# Patient Record
Sex: FEMALE | Race: OTHER | Employment: UNEMPLOYED | URBAN - METROPOLITAN AREA
[De-identification: names, ages, dates, MRNs, and addresses within clinical notes are randomized per-mention and may not be internally consistent; named-entity substitution may affect disease eponyms.]

---
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
## 2018-03-12 ENCOUNTER — APPOINTMENT (OUTPATIENT)
Dept: GENERAL RADIOLOGY | Facility: HOSPITAL | Age: 18
End: 2018-03-12
Attending: EMERGENCY MEDICINE
Payer: MEDICAID

## 2018-03-12 ENCOUNTER — HOSPITAL ENCOUNTER (EMERGENCY)
Facility: HOSPITAL | Age: 18
Discharge: HOME | End: 2018-03-12
Attending: EMERGENCY MEDICINE
Payer: MEDICAID

## 2018-03-12 VITALS
HEART RATE: 116 BPM | RESPIRATION RATE: 20 BRPM | OXYGEN SATURATION: 100 % | BODY MASS INDEX: 28.22 KG/M2 | SYSTOLIC BLOOD PRESSURE: 115 MMHG | DIASTOLIC BLOOD PRESSURE: 70 MMHG | TEMPERATURE: 100 F | WEIGHT: 140 LBS | HEIGHT: 59 IN

## 2018-03-12 DIAGNOSIS — B34.9 VIRAL SYNDROME: Primary | ICD-10-CM

## 2018-03-12 PROCEDURE — 71046 X-RAY EXAM CHEST 2 VIEWS: CPT | Performed by: EMERGENCY MEDICINE

## 2018-03-12 PROCEDURE — 99283 EMERGENCY DEPT VISIT LOW MDM: CPT

## 2018-03-14 NOTE — ED PROVIDER NOTES
Patient Seen in: [facility] Emergency Department    History   Patient presents with:  Cough/URI      HPI    Patient presents to the ED complaining of a productive cough for the past several several days symptoms moderate in severity and constant a pulses. Pulmonary/Chest: Effort normal. No stridor. No respiratory distress. She has no wheezes. Abdominal: Soft. She exhibits no distension. There is no tenderness. Musculoskeletal: She exhibits no edema or deformity. Neurological: She is alert. on file. to contribute to the complexity of this ED evaluation. ED Course: Patient presents with respiratory infectious symptoms. Chest x-ray obtained to rule out pneumonia, no abnormalities.   Likely viral etiology, patient given supportive care instru

## 2018-06-18 ENCOUNTER — HOSPITAL ENCOUNTER (EMERGENCY)
Facility: HOSPITAL | Age: 18
Discharge: HOME | End: 2018-06-18
Attending: EMERGENCY MEDICINE
Payer: MEDICAID

## 2018-06-18 VITALS
TEMPERATURE: 98 F | OXYGEN SATURATION: 99 % | WEIGHT: 140 LBS | RESPIRATION RATE: 16 BRPM | HEIGHT: 59 IN | BODY MASS INDEX: 28.22 KG/M2 | DIASTOLIC BLOOD PRESSURE: 59 MMHG | SYSTOLIC BLOOD PRESSURE: 114 MMHG | HEART RATE: 68 BPM

## 2018-06-18 DIAGNOSIS — J02.9 VIRAL PHARYNGITIS: Primary | ICD-10-CM

## 2018-06-18 PROCEDURE — 99283 EMERGENCY DEPT VISIT LOW MDM: CPT

## 2018-06-18 NOTE — ED INITIAL ASSESSMENT (HPI)
Pt reports sore throat and pain when swallowing that began today. Pt denies other sick contacts at home.  Pt reports taking tylenol this am

## 2018-06-18 NOTE — ED PROVIDER NOTES
Patient Seen in: [facility] Emergency Department    History   Patient presents with:  Sore Throat    Stated Complaint: sore throat    HPI    The patient is an 23year-old female who presents with sore throat since yesterday.   Mild nasal congestion pain. Neck supple. No neck rigidity. No Brudzinski's sign and no Kernig's sign noted. Cardiovascular: Normal rate, regular rhythm, normal heart sounds and intact distal pulses. No murmur heard.   Pulmonary/Chest: Effort normal and breath sounds normal.

## 2018-06-19 ENCOUNTER — HOSPITAL ENCOUNTER (EMERGENCY)
Facility: HOSPITAL | Age: 18
Discharge: HOME | End: 2018-06-20
Attending: EMERGENCY MEDICINE
Payer: MEDICAID

## 2018-06-19 DIAGNOSIS — J02.9 PHARYNGITIS, UNSPECIFIED ETIOLOGY: Primary | ICD-10-CM

## 2018-06-19 PROCEDURE — 99284 EMERGENCY DEPT VISIT MOD MDM: CPT

## 2018-06-19 PROCEDURE — 96361 HYDRATE IV INFUSION ADD-ON: CPT

## 2018-06-19 PROCEDURE — 85025 COMPLETE CBC W/AUTO DIFF WBC: CPT | Performed by: EMERGENCY MEDICINE

## 2018-06-19 PROCEDURE — 87430 STREP A AG IA: CPT

## 2018-06-19 PROCEDURE — 80048 BASIC METABOLIC PNL TOTAL CA: CPT | Performed by: EMERGENCY MEDICINE

## 2018-06-19 PROCEDURE — 86308 HETEROPHILE ANTIBODY SCREEN: CPT | Performed by: EMERGENCY MEDICINE

## 2018-06-19 PROCEDURE — 96374 THER/PROPH/DIAG INJ IV PUSH: CPT

## 2018-06-20 VITALS
HEIGHT: 59 IN | WEIGHT: 140 LBS | RESPIRATION RATE: 16 BRPM | TEMPERATURE: 99 F | HEART RATE: 82 BPM | SYSTOLIC BLOOD PRESSURE: 124 MMHG | BODY MASS INDEX: 28.22 KG/M2 | OXYGEN SATURATION: 99 % | DIASTOLIC BLOOD PRESSURE: 76 MMHG

## 2018-06-20 PROCEDURE — 87086 URINE CULTURE/COLONY COUNT: CPT | Performed by: EMERGENCY MEDICINE

## 2018-06-20 PROCEDURE — 81025 URINE PREGNANCY TEST: CPT

## 2018-06-20 PROCEDURE — 81001 URINALYSIS AUTO W/SCOPE: CPT | Performed by: EMERGENCY MEDICINE

## 2018-06-20 RX ORDER — DEXAMETHASONE SODIUM PHOSPHATE 4 MG/ML
10 VIAL (ML) INJECTION ONCE
Status: COMPLETED | OUTPATIENT
Start: 2018-06-20 | End: 2018-06-20

## 2018-06-20 RX ORDER — KETOROLAC TROMETHAMINE 30 MG/ML
30 INJECTION, SOLUTION INTRAMUSCULAR; INTRAVENOUS ONCE
Status: COMPLETED | OUTPATIENT
Start: 2018-06-20 | End: 2018-06-20

## 2018-06-20 RX ORDER — CEPHALEXIN 500 MG/1
500 CAPSULE ORAL 4 TIMES DAILY
Qty: 40 CAPSULE | Refills: 0 | Status: SHIPPED | OUTPATIENT
Start: 2018-06-20

## 2018-06-20 NOTE — ED PROVIDER NOTES
Patient Seen in: [facility] Emergency Department    History   Patient presents with:  Sore Throat    Stated Complaint: sore throat    HPI    She presents emergency room complaining of sore throat.   She describes dull throbbing aching pain worse wi abscesses. Eyes: Conjunctivae and EOM are normal.   Neck: Normal range of motion. Neck supple. Cardiovascular: Normal rate and regular rhythm. No murmur heard. Pulmonary/Chest: Effort normal and breath sounds normal. No respiratory distress.    Delano Del Rosario appointment as soon as possible for a visit          Medications Prescribed:  Current Discharge Medication List    START taking these medications    cephALEXin 500 MG Oral Cap  Take 1 capsule (500 mg total) by mouth 4 (four) times daily.   Qty: 40 capsule R

## 2018-06-20 NOTE — ED INITIAL ASSESSMENT (HPI)
Sore throat since Saturday.  Evaluated in ED yesterday AM and sent home with tylenol and motrin which the patient states is not helping

## 2018-09-05 ENCOUNTER — HOSPITAL ENCOUNTER (EMERGENCY)
Facility: HOSPITAL | Age: 18
Discharge: HOME | End: 2018-09-05
Attending: EMERGENCY MEDICINE
Payer: MEDICAID

## 2018-09-05 VITALS
SYSTOLIC BLOOD PRESSURE: 110 MMHG | HEART RATE: 82 BPM | WEIGHT: 146.19 LBS | RESPIRATION RATE: 18 BRPM | TEMPERATURE: 98 F | OXYGEN SATURATION: 99 % | HEIGHT: 59 IN | DIASTOLIC BLOOD PRESSURE: 60 MMHG | BODY MASS INDEX: 29.47 KG/M2

## 2018-09-05 DIAGNOSIS — J06.9 VIRAL UPPER RESPIRATORY TRACT INFECTION: Primary | ICD-10-CM

## 2018-09-05 LAB — S PYO AG THROAT QL: NEGATIVE

## 2018-09-05 PROCEDURE — 99283 EMERGENCY DEPT VISIT LOW MDM: CPT

## 2018-09-05 PROCEDURE — 87430 STREP A AG IA: CPT

## 2018-09-06 NOTE — ED PROVIDER NOTES
Patient Seen in: [facility] AND [facility] Emergency Department    History   Patient presents with:  Cough/URI    Stated Complaint: sore throat and cough    HPI    Patient is a 15-year-old female who presents with sore throat since yesterday.   Positive cough and [facility])       ED Course as of Sep 05 2232  ------------------------------------------------------------      MDM     strep negative. Tolerating PO, appears well. Advised on OTC treatments.    Pulse ox 99% RA normal          Disposition and Plan     Clinical I

## 2018-10-25 ENCOUNTER — HOSPITAL ENCOUNTER (EMERGENCY)
Facility: HOSPITAL | Age: 18
Discharge: HOME | End: 2018-10-25
Attending: EMERGENCY MEDICINE
Payer: MEDICAID

## 2018-10-25 ENCOUNTER — APPOINTMENT (OUTPATIENT)
Dept: GENERAL RADIOLOGY | Facility: HOSPITAL | Age: 18
End: 2018-10-25
Payer: MEDICAID

## 2018-10-25 VITALS
HEART RATE: 69 BPM | DIASTOLIC BLOOD PRESSURE: 79 MMHG | HEIGHT: 59 IN | OXYGEN SATURATION: 100 % | SYSTOLIC BLOOD PRESSURE: 99 MMHG | WEIGHT: 140 LBS | RESPIRATION RATE: 17 BRPM | TEMPERATURE: 99 F | BODY MASS INDEX: 28.22 KG/M2

## 2018-10-25 DIAGNOSIS — J06.9 VIRAL UPPER RESPIRATORY TRACT INFECTION: Primary | ICD-10-CM

## 2018-10-25 PROCEDURE — 87430 STREP A AG IA: CPT

## 2018-10-25 PROCEDURE — 99283 EMERGENCY DEPT VISIT LOW MDM: CPT

## 2018-10-25 PROCEDURE — 71046 X-RAY EXAM CHEST 2 VIEWS: CPT

## 2018-10-25 NOTE — ED PROVIDER NOTES
Patient Seen in: [facility] AND [facility] Emergency Department    History   Patient presents with:  Sore Throat  Cough/URI    Stated Complaint: sore throat, cough w mucus, headache    HPI    Patient presents to the emergency department today with complaint of 7 other systems reviewed and negative except as noted above. PSFH elements reviewed from today and agreed except as otherwise stated in HPI.     Physical Exam     ED Triage Vitals [10/25/18 1323]   BP 99/79   Pulse 69   Resp 17   Temp 99.1 °F (37.3 °C)   T diagnosis)    Disposition:  Discharge    Follow-up:  [facility] Le [facility]  [phone]    In 3 days  For re-check      Medications Prescribed:  Current Discharge Medication List

## 2018-11-01 ENCOUNTER — APPOINTMENT (OUTPATIENT)
Dept: OTHER | Facility: HOSPITAL | Age: 18
End: 2018-11-01
Attending: FAMILY MEDICINE

## 2019-05-20 ENCOUNTER — HOSPITAL ENCOUNTER (EMERGENCY)
Facility: HOSPITAL | Age: 19
Discharge: HOME | End: 2019-05-20
Attending: EMERGENCY MEDICINE
Payer: MEDICAID

## 2019-05-20 ENCOUNTER — APPOINTMENT (OUTPATIENT)
Dept: GENERAL RADIOLOGY | Facility: HOSPITAL | Age: 19
End: 2019-05-20
Attending: EMERGENCY MEDICINE
Payer: MEDICAID

## 2019-05-20 VITALS
BODY MASS INDEX: 29 KG/M2 | OXYGEN SATURATION: 98 % | SYSTOLIC BLOOD PRESSURE: 103 MMHG | RESPIRATION RATE: 18 BRPM | TEMPERATURE: 98 F | WEIGHT: 145 LBS | HEART RATE: 70 BPM | DIASTOLIC BLOOD PRESSURE: 67 MMHG

## 2019-05-20 DIAGNOSIS — M25.561 ARTHRALGIA OF RIGHT LOWER LEG: Primary | ICD-10-CM

## 2019-05-20 PROCEDURE — 99283 EMERGENCY DEPT VISIT LOW MDM: CPT

## 2019-05-20 PROCEDURE — 73590 X-RAY EXAM OF LOWER LEG: CPT | Performed by: EMERGENCY MEDICINE
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
## 2019-05-21 NOTE — ED INITIAL ASSESSMENT (HPI)
Pt c/o right leg pain, pt states she has metal felton placed in leg and states that leg hurts when the weather changes.
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
## 2019-05-29 ENCOUNTER — HOSPITAL ENCOUNTER (EMERGENCY)
Facility: HOSPITAL | Age: 19
Discharge: HOME | End: 2019-05-29
Payer: MEDICAID

## 2019-05-29 VITALS
OXYGEN SATURATION: 98 % | TEMPERATURE: 98 F | DIASTOLIC BLOOD PRESSURE: 69 MMHG | HEART RATE: 62 BPM | RESPIRATION RATE: 20 BRPM | SYSTOLIC BLOOD PRESSURE: 105 MMHG

## 2019-05-29 DIAGNOSIS — S39.012A STRAIN OF LUMBAR REGION, INITIAL ENCOUNTER: Primary | ICD-10-CM

## 2019-05-29 PROCEDURE — 99283 EMERGENCY DEPT VISIT LOW MDM: CPT

## 2019-05-29 PROCEDURE — 87086 URINE CULTURE/COLONY COUNT: CPT | Performed by: NURSE PRACTITIONER

## 2019-05-29 PROCEDURE — 81025 URINE PREGNANCY TEST: CPT

## 2019-05-29 PROCEDURE — 81001 URINALYSIS AUTO W/SCOPE: CPT | Performed by: NURSE PRACTITIONER

## 2019-05-29 RX ORDER — CYCLOBENZAPRINE HCL 10 MG
10 TABLET ORAL 3 TIMES DAILY PRN
Qty: 14 TABLET | Refills: 0 | Status: SHIPPED | OUTPATIENT
Start: 2019-05-29 | End: 2019-06-05

## 2019-05-29 RX ORDER — TRAMADOL HYDROCHLORIDE 50 MG/1
TABLET ORAL EVERY 6 HOURS PRN
Qty: 10 TABLET | Refills: 0 | Status: SHIPPED | OUTPATIENT
Start: 2019-05-29 | End: 2019-06-05

## 2019-05-29 RX ORDER — IBUPROFEN 600 MG/1
600 TABLET ORAL ONCE
Status: COMPLETED | OUTPATIENT
Start: 2019-05-29 | End: 2019-05-29

## 2019-05-29 NOTE — ED INITIAL ASSESSMENT (HPI)
Low back pain began yesterday. 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

## 2019-05-29 NOTE — ED NOTES
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 States \"it took my breath away\". Denies injury or trauma. Denies urinary/bowel symptoms, numbness or tingling. States took 800mg of ibuprofen last night with no relief.

## 2019-05-29 NOTE — ED PROVIDER NOTES
Patient Seen in: [facility] Emergency Department    History   Patient presents with:  Back Pain (musculoskeletal)    Stated Complaint: back pains    HPI    47-year-old female presents to the emergency department complaining of right lower back bell bilateral  Neuro: Patient is alert and oriented x3, able to ambulate with steady gait, 5 out of 5 strength bilateral lower extremities hips knees and ankle flexion and extension, dorsiflexion and plantarflexion of the foot preserved bilateral 5 out of 5 st Discharge Medication List    START taking these medications    Cyclobenzaprine HCl 10 MG Oral Tab  Take 1 tablet (10 mg total) by mouth 3 (three) times daily as needed.   Qty: 14 tablet Refills: 0    traMADol HCl 50 MG Oral Tab  Take 1-2 tablets ( mg

## 2019-10-08 ENCOUNTER — HOSPITAL ENCOUNTER (EMERGENCY)
Facility: HOSPITAL | Age: 19
Discharge: HOME | End: 2019-10-08
Attending: PHYSICIAN ASSISTANT
Payer: MEDICAID

## 2019-10-08 VITALS
TEMPERATURE: 98 F | HEART RATE: 52 BPM | HEIGHT: 59 IN | SYSTOLIC BLOOD PRESSURE: 112 MMHG | OXYGEN SATURATION: 99 % | DIASTOLIC BLOOD PRESSURE: 64 MMHG | WEIGHT: 145 LBS | RESPIRATION RATE: 17 BRPM | BODY MASS INDEX: 29.23 KG/M2

## 2019-10-08 DIAGNOSIS — R11.2 NAUSEA AND VOMITING IN ADULT: ICD-10-CM

## 2019-10-08 DIAGNOSIS — N39.0 URINARY TRACT INFECTION WITHOUT HEMATURIA, SITE UNSPECIFIED: Primary | ICD-10-CM

## 2019-10-08 PROCEDURE — 87086 URINE CULTURE/COLONY COUNT: CPT | Performed by: PHYSICIAN ASSISTANT

## 2019-10-08 PROCEDURE — 85025 COMPLETE CBC W/AUTO DIFF WBC: CPT | Performed by: PHYSICIAN ASSISTANT

## 2019-10-08 PROCEDURE — 81001 URINALYSIS AUTO W/SCOPE: CPT | Performed by: PHYSICIAN ASSISTANT

## 2019-10-08 PROCEDURE — 80053 COMPREHEN METABOLIC PANEL: CPT | Performed by: PHYSICIAN ASSISTANT

## 2019-10-08 PROCEDURE — 96374 THER/PROPH/DIAG INJ IV PUSH: CPT

## 2019-10-08 PROCEDURE — 96361 HYDRATE IV INFUSION ADD-ON: CPT

## 2019-10-08 PROCEDURE — 99284 EMERGENCY DEPT VISIT MOD MDM: CPT

## 2019-10-08 PROCEDURE — 81025 URINE PREGNANCY TEST: CPT

## 2019-10-08 RX ORDER — ONDANSETRON 4 MG/1
4 TABLET, ORALLY DISINTEGRATING ORAL EVERY 6 HOURS PRN
Qty: 10 TABLET | Refills: 0 | Status: SHIPPED | OUTPATIENT
Start: 2019-10-08 | End: 2019-10-11

## 2019-10-08 RX ORDER — ONDANSETRON 2 MG/ML
4 INJECTION INTRAMUSCULAR; INTRAVENOUS ONCE
Status: COMPLETED | OUTPATIENT
Start: 2019-10-08 | End: 2019-10-08

## 2019-10-08 RX ORDER — CEPHALEXIN 500 MG/1
500 CAPSULE ORAL 3 TIMES DAILY
Qty: 21 CAPSULE | Refills: 0 | Status: SHIPPED | OUTPATIENT
Start: 2019-10-08 | End: 2019-10-15

## 2019-10-08 NOTE — ED PROVIDER NOTES
Patient Seen in: [facility] AND [facility] Emergency Department    History   Patient presents with:  Vomiting    Stated Complaint: vomiting    HPI    Sergei Javed is a 19year old female who presents with chief complaint of nausea and vomiting.   Onset 2 days ago noted above. PSFH elements reviewed from today and agreed except as otherwise stated in HPI.     Physical Exam     ED Triage Vitals [10/08/19 1224]   /75   Pulse 72   Resp 18   Temp 98.3 °F (36.8 °C)   Temp src Oral   SpO2 99 %   O2 Device None (Ro components:       Result Value    Leukocyte Esterase Urine Moderate (*)     WBC Urine 7 (*)     Bacteria Urine Few (*)     All other components within normal limits   COMP METABOLIC PANEL (14) - Abnormal; Notable for the following components:    AST 10 (*) pressure. Medications Prescribed:  Current Discharge Medication List    START taking these medications    !! cephALEXin (KEFLEX) 500 MG Oral Cap  Take 1 capsule (500 mg total) by mouth 3 (three) times daily for 7 days.   Qty: 21 capsule Refills: 0    !!

## 2020-10-08 ENCOUNTER — HOSPITAL ENCOUNTER (EMERGENCY)
Facility: HOSPITAL | Age: 20
Discharge: HOME | End: 2020-10-08
Attending: EMERGENCY MEDICINE
Payer: MEDICAID

## 2020-10-08 VITALS
RESPIRATION RATE: 18 BRPM | HEART RATE: 75 BPM | SYSTOLIC BLOOD PRESSURE: 106 MMHG | TEMPERATURE: 98 F | WEIGHT: 140 LBS | DIASTOLIC BLOOD PRESSURE: 70 MMHG | BODY MASS INDEX: 28 KG/M2 | OXYGEN SATURATION: 100 %

## 2020-10-08 DIAGNOSIS — M43.6 TORTICOLLIS: Primary | ICD-10-CM

## 2020-10-08 PROCEDURE — 99283 EMERGENCY DEPT VISIT LOW MDM: CPT

## 2020-10-08 PROCEDURE — 96372 THER/PROPH/DIAG INJ SC/IM: CPT

## 2020-10-08 RX ORDER — KETOROLAC TROMETHAMINE 30 MG/ML
30 INJECTION, SOLUTION INTRAMUSCULAR; INTRAVENOUS ONCE
Status: COMPLETED | OUTPATIENT
Start: 2020-10-08 | End: 2020-10-08

## 2020-10-08 RX ORDER — DIAZEPAM 5 MG/1
5 TABLET ORAL ONCE
Status: COMPLETED | OUTPATIENT
Start: 2020-10-08 | End: 2020-10-08

## 2020-10-08 RX ORDER — IBUPROFEN 600 MG/1
600 TABLET ORAL EVERY 8 HOURS PRN
Qty: 15 TABLET | Refills: 0 | Status: SHIPPED | OUTPATIENT
Start: 2020-10-08 | End: 2020-10-13

## 2020-10-08 RX ORDER — HYDROCODONE BITARTRATE AND ACETAMINOPHEN 5; 325 MG/1; MG/1
1 TABLET ORAL EVERY 6 HOURS PRN
Qty: 10 TABLET | Refills: 0 | Status: SHIPPED | OUTPATIENT
Start: 2020-10-08

## 2020-10-08 RX ORDER — CYCLOBENZAPRINE HCL 10 MG
10 TABLET ORAL 3 TIMES DAILY PRN
Qty: 10 TABLET | Refills: 0 | Status: SHIPPED | OUTPATIENT
Start: 2020-10-08 | End: 2020-10-11

## 2020-10-08 RX ORDER — HYDROCODONE BITARTRATE AND ACETAMINOPHEN 5; 325 MG/1; MG/1
1 TABLET ORAL ONCE
Status: COMPLETED | OUTPATIENT
Start: 2020-10-08 | End: 2020-10-08

## 2020-10-08 RX ORDER — LIDOCAINE 50 MG/G
1 PATCH TOPICAL EVERY 24 HOURS
Qty: 6 PATCH | Refills: 0 | Status: SHIPPED | OUTPATIENT
Start: 2020-10-08 | End: 2020-10-14

## 2020-10-08 NOTE — ED NOTES
Patient provided discharge instructions. Instructions reviewed with patient. Patient verbalized understanding. All questions/concerns addressed. Paper prescription given. Pharmacy verified. Patient states pain has improved slightly.

## 2020-10-08 NOTE — ED INITIAL ASSESSMENT (HPI)
Heike c/o neck pain and stiffness since 8am.  States she stood up, turned, and heard \"pop\". States she has neck pain and difficulty turning head side to side.

## 2020-10-08 NOTE — ED PROVIDER NOTES
Patient Seen in: [facility] AND [facility] Emergency Department      History   Patient presents with:  Neck Pain    Stated Complaint: stiff neck    HPI    17-year-old female with complaints of atraumatic left-sided neck pain and difficulty moving the neck starti Effort normal. No respiratory distress. Musculoskeletal: Normal range of motion. No edema or tenderness. Neurological: Alert and oriented to person, place, and time. No focal deficits appreciated  Skin: Skin is warm and dry.    Nursing note and vitals

## 2022-06-25 ENCOUNTER — APPOINTMENT (OUTPATIENT)
Dept: ULTRASOUND IMAGING | Facility: HOSPITAL | Age: 22
End: 2022-06-25
Attending: EMERGENCY MEDICINE
Payer: MEDICAID

## 2022-06-25 ENCOUNTER — HOSPITAL ENCOUNTER (EMERGENCY)
Facility: HOSPITAL | Age: 22
Discharge: HOME | End: 2022-06-25
Attending: EMERGENCY MEDICINE
Payer: MEDICAID

## 2022-06-25 VITALS
TEMPERATURE: 99 F | OXYGEN SATURATION: 98 % | WEIGHT: 130 LBS | HEIGHT: 59 IN | HEART RATE: 62 BPM | SYSTOLIC BLOOD PRESSURE: 102 MMHG | BODY MASS INDEX: 26.21 KG/M2 | DIASTOLIC BLOOD PRESSURE: 69 MMHG | RESPIRATION RATE: 16 BRPM

## 2022-06-25 DIAGNOSIS — Z34.90 EARLY STAGE OF PREGNANCY: Primary | ICD-10-CM

## 2022-06-25 DIAGNOSIS — N39.0 URINARY TRACT INFECTION WITHOUT HEMATURIA, SITE UNSPECIFIED: ICD-10-CM

## 2022-06-25 LAB
B-HCG SERPL-ACNC: 1533 MIU/ML
BASOPHILS # BLD AUTO: 0.02 X10(3) UL (ref 0–0.2)
BASOPHILS NFR BLD AUTO: 0.2 %
BILIRUB UR QL: NEGATIVE
COLOR UR: YELLOW
DEPRECATED RDW RBC AUTO: 47.7 FL (ref 35.1–46.3)
EOSINOPHIL # BLD AUTO: 0.05 X10(3) UL (ref 0–0.7)
EOSINOPHIL NFR BLD AUTO: 0.6 %
ERYTHROCYTE [DISTWIDTH] IN BLOOD BY AUTOMATED COUNT: 16.4 % (ref 11–15)
GLUCOSE UR-MCNC: NEGATIVE MG/DL
HCT VFR BLD AUTO: 36.9 %
HGB BLD-MCNC: 11.3 G/DL
HGB UR QL STRIP.AUTO: NEGATIVE
IMM GRANULOCYTES # BLD AUTO: 0.01 X10(3) UL (ref 0–1)
IMM GRANULOCYTES NFR BLD: 0.1 %
KETONES UR-MCNC: NEGATIVE MG/DL
LYMPHOCYTES # BLD AUTO: 3.41 X10(3) UL (ref 1–4)
LYMPHOCYTES NFR BLD AUTO: 38.1 %
MCH RBC QN AUTO: 24.7 PG (ref 26–34)
MCHC RBC AUTO-ENTMCNC: 30.6 G/DL (ref 31–37)
MCV RBC AUTO: 80.7 FL
MONOCYTES # BLD AUTO: 0.52 X10(3) UL (ref 0.1–1)
MONOCYTES NFR BLD AUTO: 5.8 %
NEUTROPHILS # BLD AUTO: 4.94 X10 (3) UL (ref 1.5–7.7)
NEUTROPHILS # BLD AUTO: 4.94 X10(3) UL (ref 1.5–7.7)
NEUTROPHILS NFR BLD AUTO: 55.2 %
NITRITE UR QL STRIP.AUTO: NEGATIVE
PH UR: 6 [PH] (ref 5–8)
PLATELET # BLD AUTO: 332 10(3)UL (ref 150–450)
PROT UR-MCNC: 30 MG/DL
RBC # BLD AUTO: 4.57 X10(6)UL
RH BLOOD TYPE: POSITIVE
SP GR UR STRIP: >1.03 (ref 1–1.03)
UROBILINOGEN UR STRIP-ACNC: 2
VIT C UR-MCNC: NEGATIVE MG/DL
WBC # BLD AUTO: 9 X10(3) UL (ref 4–11)

## 2022-06-25 PROCEDURE — 81025 URINE PREGNANCY TEST: CPT

## 2022-06-25 PROCEDURE — 36415 COLL VENOUS BLD VENIPUNCTURE: CPT

## 2022-06-25 PROCEDURE — 86900 BLOOD TYPING SEROLOGIC ABO: CPT | Performed by: EMERGENCY MEDICINE

## 2022-06-25 PROCEDURE — 85025 COMPLETE CBC W/AUTO DIFF WBC: CPT | Performed by: EMERGENCY MEDICINE

## 2022-06-25 PROCEDURE — 81001 URINALYSIS AUTO W/SCOPE: CPT | Performed by: EMERGENCY MEDICINE

## 2022-06-25 PROCEDURE — 99284 EMERGENCY DEPT VISIT MOD MDM: CPT

## 2022-06-25 PROCEDURE — 76801 OB US < 14 WKS SINGLE FETUS: CPT | Performed by: EMERGENCY MEDICINE

## 2022-06-25 PROCEDURE — 76817 TRANSVAGINAL US OBSTETRIC: CPT | Performed by: EMERGENCY MEDICINE

## 2022-06-25 PROCEDURE — 84702 CHORIONIC GONADOTROPIN TEST: CPT | Performed by: EMERGENCY MEDICINE

## 2022-06-25 PROCEDURE — 86901 BLOOD TYPING SEROLOGIC RH(D): CPT | Performed by: EMERGENCY MEDICINE

## 2022-06-25 RX ORDER — ACETAMINOPHEN 500 MG
1000 TABLET ORAL ONCE
Status: COMPLETED | OUTPATIENT
Start: 2022-06-25 | End: 2022-06-25

## 2022-06-25 RX ORDER — NITROFURANTOIN 25; 75 MG/1; MG/1
100 CAPSULE ORAL 2 TIMES DAILY
Qty: 14 CAPSULE | Refills: 0 | Status: SHIPPED | OUTPATIENT
Start: 2022-06-25 | End: 2022-07-02

## 2022-06-26 NOTE — ED INITIAL ASSESSMENT (HPI)
Pt just found out 3 days that she was pregnant. Pt states that she is having cramping but report no bleeding.

## 2022-06-26 NOTE — ED QUICK NOTES
Discharge instructions given to pt. Pt verbalized understanding of home care, medication use, and to follow up with OB. Pt denied further questions or concerns. Pt ambulatory out of ED with boyfriend, pt discharged in stable condition.

## 2022-06-27 LAB — B-HCG UR QL: POSITIVE

## 2022-07-05 ENCOUNTER — HOSPITAL ENCOUNTER (EMERGENCY)
Facility: HOSPITAL | Age: 22
Discharge: HOME | End: 2022-07-05
Attending: EMERGENCY MEDICINE
Payer: MEDICAID

## 2022-07-05 VITALS
BODY MASS INDEX: 27.42 KG/M2 | HEIGHT: 59 IN | WEIGHT: 136 LBS | SYSTOLIC BLOOD PRESSURE: 105 MMHG | HEART RATE: 58 BPM | DIASTOLIC BLOOD PRESSURE: 62 MMHG | TEMPERATURE: 99 F | RESPIRATION RATE: 18 BRPM | OXYGEN SATURATION: 100 %

## 2022-07-05 DIAGNOSIS — N30.00 ACUTE CYSTITIS WITHOUT HEMATURIA: ICD-10-CM

## 2022-07-05 DIAGNOSIS — O21.0 HYPEREMESIS GRAVIDARUM: Primary | ICD-10-CM

## 2022-07-05 LAB
ANION GAP SERPL CALC-SCNC: 11 MMOL/L (ref 0–18)
BASOPHILS # BLD AUTO: 0.02 X10(3) UL (ref 0–0.2)
BASOPHILS NFR BLD AUTO: 0.2 %
BILIRUB UR QL CFM: NEGATIVE
BUN BLD-MCNC: 6 MG/DL (ref 7–18)
BUN/CREAT SERPL: 10.5 (ref 10–20)
CALCIUM BLD-MCNC: 9.3 MG/DL (ref 8.5–10.1)
CHLORIDE SERPL-SCNC: 105 MMOL/L (ref 98–112)
CLARITY UR: CLEAR
CO2 SERPL-SCNC: 23 MMOL/L (ref 21–32)
COLOR UR: YELLOW
CREAT BLD-MCNC: 0.57 MG/DL
DEPRECATED RDW RBC AUTO: 45.3 FL (ref 35.1–46.3)
EOSINOPHIL # BLD AUTO: 0.03 X10(3) UL (ref 0–0.7)
EOSINOPHIL NFR BLD AUTO: 0.3 %
ERYTHROCYTE [DISTWIDTH] IN BLOOD BY AUTOMATED COUNT: 15.8 % (ref 11–15)
GLUCOSE BLD-MCNC: 80 MG/DL (ref 70–99)
GLUCOSE UR-MCNC: NEGATIVE MG/DL
HCT VFR BLD AUTO: 37.3 %
HGB BLD-MCNC: 11.7 G/DL
IMM GRANULOCYTES # BLD AUTO: 0.02 X10(3) UL (ref 0–1)
IMM GRANULOCYTES NFR BLD: 0.2 %
KETONES UR-MCNC: 40 MG/DL
LYMPHOCYTES # BLD AUTO: 3.48 X10(3) UL (ref 1–4)
LYMPHOCYTES NFR BLD AUTO: 37 %
MCH RBC QN AUTO: 24.9 PG (ref 26–34)
MCHC RBC AUTO-ENTMCNC: 31.4 G/DL (ref 31–37)
MCV RBC AUTO: 79.4 FL
MONOCYTES # BLD AUTO: 0.47 X10(3) UL (ref 0.1–1)
MONOCYTES NFR BLD AUTO: 5 %
NEUTROPHILS # BLD AUTO: 5.38 X10 (3) UL (ref 1.5–7.7)
NEUTROPHILS # BLD AUTO: 5.38 X10(3) UL (ref 1.5–7.7)
NEUTROPHILS NFR BLD AUTO: 57.3 %
NITRITE UR QL STRIP.AUTO: NEGATIVE
OSMOLALITY SERPL CALC.SUM OF ELEC: 285 MOSM/KG (ref 275–295)
PH UR: 5.5 [PH] (ref 5–8)
PLATELET # BLD AUTO: 300 10(3)UL (ref 150–450)
POTASSIUM SERPL-SCNC: 3.7 MMOL/L (ref 3.5–5.1)
RBC # BLD AUTO: 4.7 X10(6)UL
SODIUM SERPL-SCNC: 139 MMOL/L (ref 136–145)
SP GR UR STRIP: >=1.03 (ref 1–1.03)
UROBILINOGEN UR STRIP-ACNC: 0.2
WBC # BLD AUTO: 9.4 X10(3) UL (ref 4–11)
WBC #/AREA URNS AUTO: >50 /HPF

## 2022-07-05 PROCEDURE — 99284 EMERGENCY DEPT VISIT MOD MDM: CPT

## 2022-07-05 PROCEDURE — 81001 URINALYSIS AUTO W/SCOPE: CPT | Performed by: EMERGENCY MEDICINE

## 2022-07-05 PROCEDURE — 85025 COMPLETE CBC W/AUTO DIFF WBC: CPT | Performed by: EMERGENCY MEDICINE

## 2022-07-05 PROCEDURE — 87086 URINE CULTURE/COLONY COUNT: CPT | Performed by: EMERGENCY MEDICINE

## 2022-07-05 PROCEDURE — 96361 HYDRATE IV INFUSION ADD-ON: CPT

## 2022-07-05 PROCEDURE — 96374 THER/PROPH/DIAG INJ IV PUSH: CPT

## 2022-07-05 PROCEDURE — 80048 BASIC METABOLIC PNL TOTAL CA: CPT | Performed by: EMERGENCY MEDICINE

## 2022-07-05 RX ORDER — CEPHALEXIN 500 MG/1
500 CAPSULE ORAL 2 TIMES DAILY
Qty: 10 CAPSULE | Refills: 0 | Status: SHIPPED | OUTPATIENT
Start: 2022-07-05 | End: 2022-07-10

## 2022-07-05 RX ORDER — ONDANSETRON 4 MG/1
4 TABLET, ORALLY DISINTEGRATING ORAL EVERY 4 HOURS PRN
Qty: 15 TABLET | Refills: 0 | Status: SHIPPED | OUTPATIENT
Start: 2022-07-05 | End: 2022-07-12

## 2022-07-05 RX ORDER — CEPHALEXIN 500 MG/1
500 CAPSULE ORAL ONCE
Status: COMPLETED | OUTPATIENT
Start: 2022-07-05 | End: 2022-07-05

## 2022-07-05 RX ORDER — PYRIDOXINE HCL (VITAMIN B6) 25 MG
25 TABLET ORAL NIGHTLY
Qty: 30 TABLET | Refills: 0 | Status: SHIPPED | OUTPATIENT
Start: 2022-07-05 | End: 2022-08-04

## 2022-07-05 RX ORDER — ONDANSETRON 2 MG/ML
4 INJECTION INTRAMUSCULAR; INTRAVENOUS ONCE
Status: COMPLETED | OUTPATIENT
Start: 2022-07-05 | End: 2022-07-05

## 2022-07-06 NOTE — ED QUICK NOTES
Patient reports she was at another hospital earlier and was given a dose of ODT Zofran. Per patient, it helped for about 5 minutes. 8 weeks pregnant.

## 2022-07-06 NOTE — ED INITIAL ASSESSMENT (HPI)
Patient arrives to the ER complaining of N/V for the last 5 days. Pt is about 8 weeks pregnant. Told by pmd to come in to be evaluated.

## 2022-07-08 LAB — AMB EXT THINPREP PAP: NEGATIVE

## 2022-07-16 ENCOUNTER — APPOINTMENT (OUTPATIENT)
Dept: ULTRASOUND IMAGING | Facility: HOSPITAL | Age: 22
End: 2022-07-16
Attending: EMERGENCY MEDICINE
Payer: MEDICAID

## 2022-07-16 ENCOUNTER — HOSPITAL ENCOUNTER (EMERGENCY)
Facility: HOSPITAL | Age: 22
Discharge: HOME | End: 2022-07-16
Attending: EMERGENCY MEDICINE
Payer: MEDICAID

## 2022-07-16 VITALS
RESPIRATION RATE: 20 BRPM | SYSTOLIC BLOOD PRESSURE: 102 MMHG | HEIGHT: 59 IN | DIASTOLIC BLOOD PRESSURE: 61 MMHG | BODY MASS INDEX: 25.2 KG/M2 | TEMPERATURE: 98 F | HEART RATE: 62 BPM | WEIGHT: 125 LBS | OXYGEN SATURATION: 99 %

## 2022-07-16 DIAGNOSIS — O41.8X10 SUBCHORIONIC HEMORRHAGE OF PLACENTA IN FIRST TRIMESTER, SINGLE OR UNSPECIFIED FETUS: ICD-10-CM

## 2022-07-16 DIAGNOSIS — O46.8X1 SUBCHORIONIC HEMORRHAGE OF PLACENTA IN FIRST TRIMESTER, SINGLE OR UNSPECIFIED FETUS: ICD-10-CM

## 2022-07-16 DIAGNOSIS — O21.9 NAUSEA AND VOMITING IN PREGNANCY: Primary | ICD-10-CM

## 2022-07-16 LAB
ANION GAP SERPL CALC-SCNC: 11 MMOL/L (ref 0–18)
B-HCG SERPL-ACNC: ABNORMAL MIU/ML
B-HCG UR QL: POSITIVE
BASOPHILS # BLD AUTO: 0.02 X10(3) UL (ref 0–0.2)
BASOPHILS NFR BLD AUTO: 0.2 %
BILIRUB UR QL: NEGATIVE
BUN BLD-MCNC: 6 MG/DL (ref 7–18)
BUN/CREAT SERPL: 10 (ref 10–20)
CALCIUM BLD-MCNC: 9.3 MG/DL (ref 8.5–10.1)
CHLORIDE SERPL-SCNC: 102 MMOL/L (ref 98–112)
CO2 SERPL-SCNC: 23 MMOL/L (ref 21–32)
COLOR UR: YELLOW
CREAT BLD-MCNC: 0.6 MG/DL
DEPRECATED RDW RBC AUTO: 44.1 FL (ref 35.1–46.3)
EOSINOPHIL # BLD AUTO: 0.01 X10(3) UL (ref 0–0.7)
EOSINOPHIL NFR BLD AUTO: 0.1 %
ERYTHROCYTE [DISTWIDTH] IN BLOOD BY AUTOMATED COUNT: 15.3 % (ref 11–15)
GLUCOSE BLD-MCNC: 81 MG/DL (ref 70–99)
GLUCOSE UR-MCNC: NEGATIVE MG/DL
HCT VFR BLD AUTO: 36.8 %
HGB BLD-MCNC: 11.7 G/DL
HGB UR QL STRIP.AUTO: NEGATIVE
IMM GRANULOCYTES # BLD AUTO: 0.02 X10(3) UL (ref 0–1)
IMM GRANULOCYTES NFR BLD: 0.2 %
KETONES UR-MCNC: 80 MG/DL
LYMPHOCYTES # BLD AUTO: 2.1 X10(3) UL (ref 1–4)
LYMPHOCYTES NFR BLD AUTO: 23.1 %
MCH RBC QN AUTO: 25.2 PG (ref 26–34)
MCHC RBC AUTO-ENTMCNC: 31.8 G/DL (ref 31–37)
MCV RBC AUTO: 79.3 FL
MONOCYTES # BLD AUTO: 0.46 X10(3) UL (ref 0.1–1)
MONOCYTES NFR BLD AUTO: 5 %
NEUTROPHILS # BLD AUTO: 6.5 X10 (3) UL (ref 1.5–7.7)
NEUTROPHILS # BLD AUTO: 6.5 X10(3) UL (ref 1.5–7.7)
NEUTROPHILS NFR BLD AUTO: 71.4 %
NITRITE UR QL STRIP.AUTO: NEGATIVE
OSMOLALITY SERPL CALC.SUM OF ELEC: 279 MOSM/KG (ref 275–295)
PH UR: 6 [PH] (ref 5–8)
PLATELET # BLD AUTO: 297 10(3)UL (ref 150–450)
POTASSIUM SERPL-SCNC: 3.6 MMOL/L (ref 3.5–5.1)
PROT UR-MCNC: 30 MG/DL
RBC # BLD AUTO: 4.64 X10(6)UL
RH BLOOD TYPE: POSITIVE
SODIUM SERPL-SCNC: 136 MMOL/L (ref 136–145)
SP GR UR STRIP: 1.03 (ref 1–1.03)
UROBILINOGEN UR STRIP-ACNC: 4
VIT C UR-MCNC: NEGATIVE MG/DL
WBC # BLD AUTO: 9.1 X10(3) UL (ref 4–11)

## 2022-07-16 PROCEDURE — 99284 EMERGENCY DEPT VISIT MOD MDM: CPT

## 2022-07-16 PROCEDURE — 81001 URINALYSIS AUTO W/SCOPE: CPT | Performed by: EMERGENCY MEDICINE

## 2022-07-16 PROCEDURE — 84702 CHORIONIC GONADOTROPIN TEST: CPT | Performed by: EMERGENCY MEDICINE

## 2022-07-16 PROCEDURE — 85025 COMPLETE CBC W/AUTO DIFF WBC: CPT | Performed by: EMERGENCY MEDICINE

## 2022-07-16 PROCEDURE — 76801 OB US < 14 WKS SINGLE FETUS: CPT | Performed by: EMERGENCY MEDICINE

## 2022-07-16 PROCEDURE — 96374 THER/PROPH/DIAG INJ IV PUSH: CPT

## 2022-07-16 PROCEDURE — 76817 TRANSVAGINAL US OBSTETRIC: CPT | Performed by: EMERGENCY MEDICINE

## 2022-07-16 PROCEDURE — 86900 BLOOD TYPING SEROLOGIC ABO: CPT | Performed by: EMERGENCY MEDICINE

## 2022-07-16 PROCEDURE — 96361 HYDRATE IV INFUSION ADD-ON: CPT

## 2022-07-16 PROCEDURE — 81025 URINE PREGNANCY TEST: CPT

## 2022-07-16 PROCEDURE — 87086 URINE CULTURE/COLONY COUNT: CPT | Performed by: EMERGENCY MEDICINE

## 2022-07-16 PROCEDURE — 86901 BLOOD TYPING SEROLOGIC RH(D): CPT | Performed by: EMERGENCY MEDICINE

## 2022-07-16 PROCEDURE — 80048 BASIC METABOLIC PNL TOTAL CA: CPT | Performed by: EMERGENCY MEDICINE

## 2022-07-16 RX ORDER — ONDANSETRON 2 MG/ML
4 INJECTION INTRAMUSCULAR; INTRAVENOUS ONCE
Status: COMPLETED | OUTPATIENT
Start: 2022-07-16 | End: 2022-07-16

## 2022-07-16 NOTE — ED INITIAL ASSESSMENT (HPI)
Patient to ER with c/o nausea, vomiting, and low back pain with weakness. Patient states she was seen here for same last night and continues to same symptoms despite taking Zofran.

## 2022-07-17 NOTE — ED QUICK NOTES
Pt to ED is unsure how far along she is in her pregnancy and has been struggling with nausea/vomiting hyperemesis. Pt also mentions she's been having back pain and this is her second pregnancy and states it feels different. She says she is unhappy with her gynecologist and is looking for a new one, she has also had 2 normal ultrasounds. Denies any vaginal bleeding but reports some mucous discharge. No other GI/ sx.

## 2022-07-17 NOTE — ED QUICK NOTES
Pt was given discharge papers, no further questions following instructions. Pt was seen leaving the ER with family.

## 2022-07-19 ENCOUNTER — TELEPHONE (OUTPATIENT)
Dept: SCHEDULING | Age: 22
End: 2022-07-19

## 2022-07-20 ENCOUNTER — NURSE ONLY (OUTPATIENT)
Dept: OBGYN | Age: 22
End: 2022-07-20

## 2022-07-20 ENCOUNTER — TELEPHONE (OUTPATIENT)
Dept: OBGYN | Age: 22
End: 2022-07-20

## 2022-07-20 DIAGNOSIS — Z01.419 GYNECOLOGIC EXAM NORMAL: Primary | ICD-10-CM

## 2024-12-02 LAB
AMB EXT CHLAMYDIA, NUCLEIC ACID AMP: NEGATIVE
AMB EXT GONOCOCCUS, NUCLEIC ACID AMP: NEGATIVE
AMB EXT HBSAG SCREEN: NEGATIVE
AMB EXT HEMATOCRIT: 30.7
AMB EXT HEMOGLOBIN: 8.8
AMB EXT PLATELETS: 386
AMB EXT RUBELLA ANTIBODIES, IGG: 11.1

## 2025-01-27 ENCOUNTER — TELEPHONE (OUTPATIENT)
Dept: OBGYN CLINIC | Facility: CLINIC | Age: 25
End: 2025-01-27

## 2025-01-27 NOTE — TELEPHONE ENCOUNTER
Patient states last menstrual period is 9/28/24 which would put her at 17w2d.  Patient informed we do not have any openings prior to her 20 week tracey and our doctors will not accept new patients after 20 weeks.  Patient states she is actually 15w2d and had an ultrasound done.  Patient informed if she had any prenatal care we will need lab results, ultrasound results and office notes.  Patient asked if our doctors will do a tubal ligation at 24 years old.  Patient informed I am unsure if the doctors will do this.  Patient will have records sent.  Patient informed once records are reviewed we will call her.  Patient agrees to see all providers.

## 2025-01-27 NOTE — TELEPHONE ENCOUNTER
Lmp 9/28, not seeing any OB's up to this point.  Patient seen in ER for rash on 7/16/22.    Pls advise

## 2025-02-03 ENCOUNTER — NURSE ONLY (OUTPATIENT)
Dept: OBGYN CLINIC | Facility: CLINIC | Age: 25
End: 2025-02-03

## 2025-02-03 ENCOUNTER — TELEPHONE (OUTPATIENT)
Dept: OBGYN CLINIC | Facility: CLINIC | Age: 25
End: 2025-02-03

## 2025-02-03 DIAGNOSIS — Z34.92 ENCOUNTER FOR SUPERVISION OF NORMAL PREGNANCY IN SECOND TRIMESTER, UNSPECIFIED GRAVIDITY (HCC): Primary | ICD-10-CM

## 2025-02-03 RX ORDER — METOCLOPRAMIDE 10 MG/1
10 TABLET ORAL EVERY 6 HOURS PRN
COMMUNITY
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
## 2025-02-03 NOTE — TELEPHONE ENCOUNTER
Patient had OB Nurse Education for transfer at 16w4d. Patient 1st trimester lab at outside facility on 12/02/24 HGB=8.8. patient reports provider stated patient to take prenatal vitamins with iron and denies additional iron and follow up. Currently taking prenatal vitamins with iron. Reports she had blood transfusion during prior pregnancy in 2024.  Message to Dr. Helton on call to advise if recommendations for additional labs?

## 2025-02-04 NOTE — TELEPHONE ENCOUNTER
Informed patient of Dr. Helton orders and labs that should be completed at the latest on 2/15/25. States she started the prenatal vitamin with iron about one month ago but has not been consistent with it.

## 2025-02-19 NOTE — PROGRESS NOTES
Transfer of care.  Has not been taking iron--will start.  Order for 20 week ultrasound.  RTC 4 wk

## 2025-04-09 NOTE — TELEPHONE ENCOUNTER
----- Message from Carla MAYA Page sent at 4/7/2025  5:35 PM CDT -----  Patient hemoglobin is in severe anemia range- please refer her to heme-onc asap.  Have her take slow fe bid at a separate time from her pnv if not doing this already.    Referral order placed. Patient informed and verbalized understanding. Provided patient with contact number to Dr. Gruber's group.

## 2025-04-14 NOTE — PROGRESS NOTES
The following individual(s) verbally consented to be recorded using ambient AI listening technology and understand that they can each withdraw their consent to this listening technology at any point by asking the clinician to turn off or pause the recording:    Patient name: Heike Ha  Additional names:       CC:  Juany Morales is here today for Office Visit (6mth follow up)   no questions or concern    Medications have been reviewed and updated and No medications are needed to be refilled at this time    Patient preferred phone number: [phone]  Ok to leave detailed message on [employer]    Health Maintenance Due   Topic Date Due   • DTaP/Tdap/Td Vaccine (1 - Tdap) Never done   • Shingles Vaccine (1 of 2) Never done   • Medicare Advantage- Medicare Wellness Visit  01/01/2022   • DM/CKD Microalbumin  01/29/2022   • Depression Screening  10/12/2022       Patient is up to date, no discussion needed.      Recent Review Flowsheet Data     Date 4/12/2022    Adult PHQ 2 Score 0    Adult PHQ 2 Interpretation No further screening needed    Little interest or pleasure in activity? Not at all    Feeling down, depressed or hopeless? Not at all

## 2025-04-15 NOTE — TELEPHONE ENCOUNTER
New Consult for Anemia in Pregnancy, Second Trimester (HCC) [O99.012], Referred by Dr. PENELOPE Wise to see Dr. Diaz

## 2025-04-16 NOTE — PROGRESS NOTES
Form placed on [employer] desk   Hematology/Oncology Initial Consultation Note    Patient Name: Heike Ha  Medical Record Number: [id number]    YOB: 2000   Date of Consultation: 2025   PCP: None Pcp   Other providers:      Chief complaint:  Heike Ha was seen today for uterine contractions during RBC transfusion for the diagnosis of severe anemia      ===================================================  History of Present Illness:    58-year-old female, who is currently 26 weeks pregnant presents to the clinic for consultation for anemia.  Patient denies having any active bleeding. She has had heart burn, reflux along with nausea.   Noted to be severely anemic in prior pregnancy and received blood transfusion. Had  section and delivered baby in 2024.     -25 patient receiving IV RBC transfusion after 15 min patient reported multiple small contractions and pain that resolved in 5 min at 2pm, VSS, abd soft, baby moving, no bleeding.  -waited 15 minutes gave NSS IV and resumed RBC transfusion.  -4:25 pm completed RBC transfusion, and reports when got up to urinate had one long painful contraction, with pain in suprapubic area, resolved in 3 minutes, no bleeding no drainage.  -Unable to reach Dr. Wise, spoke with Dr. Lynn and agreed for pain for patient to go to labor and delivery if 4 contractions in 1 hour or if patient concerned. Recommended patient to go to labor and deliever, patient delicned and will call labor and delivery if concerned or 4 or more contractions in 1 hour.  -VSS, denies pain, no bleeding, no vaginal discharge, abd soft, baby moving  -Has follow up  on 25        Past Medical History:  Past Medical History[1]    Past Surgical History[2]    Home Medications:  Current Medications[3] MVI  -------  Medications Ordered Prior to Encounter[4]    Allergies:   Allergies[5]  none  Psychosocial History:  Social History     Social History Narrative    Not on file      Short Social Hx on File[6]    Family Medical History:  Family History[7]    Review of Systems:  A 10-point ROS was done with pertinent positives and negative per the HPI    Vital Signs:  Height: 149.9 cm (4' 11\") (04/16 0935)  Weight: 61.7 kg (136 lb) (04/16 0935)  BSA (Calculated - sq m): 1.57 sq meters (04/16 0935)  Pulse: 74 (04/16 1639)  BP: 98/62 (04/16 1639)  Temp: 98.2 °F (36.8 °C) (04/16 1622)  Do Not Use - Resp Rate: --  SpO2: 100 % (04/16 1533)    Wt Readings from Last 6 Encounters:   04/16/25 61.7 kg (136 lb)   04/14/25 61.3 kg (135 lb 3.2 oz)   03/21/25 60.2 kg (132 lb 12.8 oz)   02/18/25 58.3 kg (128 lb 9.6 oz)   07/16/22 56.7 kg (125 lb)   07/05/22 61.7 kg (136 lb)       ECOG PS: 0    Physical Examination:  General: Patient is alert and oriented, not in acute distress  Psych: Mood and affect are appropriate  Eyes: EOMI, PERRL  ENT: Oropharynx is clear, no adenopathy  CV: Regular rate and rhythm, normal S1S2, no murmurs, no LE edema  Respiratory: Lungs clear to auscultation bilaterally  GI/Abd: Soft, non-tender with normoactive bowel sounds, no hepatosplenomegaly  Neurological: Grossly intact   Lymphatics:   Skin: no rashes or petechiae      Laboratory:  Lab Results   Component Value Date    WBC 7.5 04/16/2025    WBC 8.0 04/05/2025    WBC 6.5 02/15/2025    HGB 6.9 (LL) 04/16/2025    HGB 7.4 (L) 04/05/2025    HGB 8.1 (L) 02/15/2025    HCT 24.2 (L) 04/16/2025    MCV 64.9 (L) 04/16/2025    MCH 18.5 (L) 04/16/2025    MCHC 28.5 (L) 04/16/2025    RDW 18.1 (H) 04/16/2025    .0 04/16/2025    .0 04/05/2025    .0 02/15/2025     Lab Results   Component Value Date    GLU 81 07/16/2022    BUN 6 (L) 07/16/2022    BUNCREA 10.0 07/16/2022    CREATSERUM 0.60 07/16/2022    CREATSERUM 0.57 07/05/2022    CREATSERUM 0.64 10/08/2019    ANIONGAP 11 07/16/2022    GFRNAA 129 07/16/2022    GFRAA 149 07/16/2022    CA 9.3 07/16/2022    OSMOCALC 279 07/16/2022    ALKPHO 122 10/08/2019    AST 10 (L)  10/08/2019    ALT 18 10/08/2019    BILT 0.3 10/08/2019    TP 8.2 10/08/2019    ALB 4.2 10/08/2019    GLOBULIN 4.0 10/08/2019     2022    K 3.6 2022     2022    CO2 23.0 2022     No results found for: \"PTT\", \"PT\", \"INR\"    Imaging:    NA    Impression & Plan:     26 weeks pregnant, presenting with severe anemia likely secondary to iron deficiency.    Iron deficiency likely secondary to blood loss from menorrhagia, recurrent pregnancies.    - iron studies consistent with iron deficiency  - schedule 1500mg IV iron dextran, close monitoring in infusion for reaction  - repeat CBC, iron, tibc, ferritin in 6 weeks to reassess iron stores    Given the Hb of 6.9, we will proceed with PRBC transfusion. Risks and benefits discussed.       -25 patient receiving IV RBC transfusion after 15 min patient reported multiple small contractions and pain that resolved in 5 min at 2pm, VSS, abd soft, baby moving, no bleeding.  -waited 15 minutes gave NSS IV and resumed RBC transfusion.  -4:25 pm completed RBC transfusion, and reports when got up to urinate had one long painful contraction, with pain in suprapubic area, resolved in 3 minutes, no bleeding no drainage.  -Unable to reach Dr. Wise, spoke with Dr. Lynn and agreed for pain for patient to go to labor and delivery if 4 contractions in 1 hour or if patient concerned. Recommended patient to go to labor and deliever, patient delicned and will call labor and delivery if concerned or 4 or more contractions in 1 hour.  -VSS, denies pain, no bleeding, no vaginal discharge, abd soft, baby moving  -Has follow up  on 25        NI Castaneda    [facility]               [1]   Past Medical History:   Anemia    gestational    Decorative tattoo    Genital herpes simplex    Pregnancy (HCC)    Transfusion history   [2]   Past Surgical History:  Procedure Laterality Date          Leg/ankle  surgery proc unlisted      felton in right leg   [3]   No outpatient medications have been marked as taking for the 4/16/25 encounter (Appointment) with Felicitas Liu APRN.   [4]   Current Outpatient Medications on File Prior to Visit   Medication Sig Dispense Refill    Ferrous Sulfate ER (SLOW FE) 45 MG Oral Tab CR Take by mouth.      Prenatal Multivit-Min-Fe-FA (PRENATAL VITAMINS OR) Take 1 tablet by mouth in the morning.       No current facility-administered medications on file prior to visit.   [5] No Known Allergies  [6]   Social History  Socioeconomic History    Marital status: Single   Tobacco Use    Smoking status: Never    Smokeless tobacco: Never   Substance and Sexual Activity    Alcohol use: No    Drug use: No     Social Drivers of Health     Food Insecurity: No Food Insecurity (2/17/2025)    NCSS - Food Insecurity     Worried About Running Out of Food in the Last Year: No     Ran Out of Food in the Last Year: No   Transportation Needs: No Transportation Needs (2/17/2025)    NCSS - Transportation     Lack of Transportation: No   Stress: No Stress Concern Present (2/17/2025)    Stress     Feeling of Stress : No   Housing Stability: Not At Risk (2/17/2025)    NCSS - Housing/Utilities     Has Housing: Yes     Worried About Losing Housing: No     Unable to Get Utilities: No   [7]   Family History  Problem Relation Age of Onset    No Known Problems Father     No Known Problems Mother

## 2025-04-16 NOTE — PATIENT INSTRUCTIONS
Post Transfusion Instructions for Out-Patients    Most recipients of blood transfusions do not experience any adverse effects.  You may resume your normal activities 4 to 6 hours after your blood transfusion.  Occasionally, reactions of blood transfusions may be delayed (for several weeks).    Symptoms of a transfusion reaction can include:    Faintness  Weakness  Nausea  Vomiting  Shortness of breath  Chest pain  Back pain  Hives  Rash  Itch  Flushing  Fever  Chills  Jaundice (yellowish tint to eyes/skin)  Dark or red urine    Though these symptoms may not be related to the blood transfusions, they should be reported to your physician.  Contact both your physician and the laboratory Blood Bank (see information below) so that your symptoms can be thoroughly evaluated.    Your physician's name: Jovana Diaz  Your physician's phone number: [phone]    If your physician is unavailable, contact the Emergency Department.    [facility] Blood Bank: [phone]  [facility] Emergency Department: [phone]

## 2025-04-16 NOTE — PROGRESS NOTES
Hematology/Oncology Initial Consultation Note    Patient Name: Heike Ha  Medical Record Number: [id number]    YOB: 2000   Date of Consultation: 2025   PCP: None Pcp   Other providers:      Reason for Consultation:  Heike Ha was seen today for the diagnosis of severe anemia      ===================================================  History of Present Illness:    58-year-old female, who is currently 26 weeks pregnant presents to the clinic for consultation for anemia.  Patient denies having any active bleeding. She has had heart burn, reflux along with nausea.   Noted to be severely anemic in prior pregnancy and received blood transfusion. Had  section and delivered baby in 2024.     Menorrhagia+. No bleeding per rectum, no melena. Hematuria. No gastric surgery. No gastritis. No malabsorption, celiac disease.     Ferriitn levels were low. On oral iron.       She was noted to have some palpitations, self resolved.  No lightheadedness, chest pain.        Past Medical History:  Past Medical History[1]    Past Surgical History[2]    Home Medications:  Current Medications[3]  -------  Medications Ordered Prior to Encounter[4]    Allergies:   Allergies[5]    Psychosocial History:  Social History     Social History Narrative    Not on file     Short Social Hx on File[6]    Family Medical History:  Family History[7]    Review of Systems:  A 10-point ROS was done with pertinent positives and negative per the HPI    Vital Signs:  Height: 149.9 cm (4' 11\") (935)  Weight: 61.7 kg (136 lb) (935)  BSA (Calculated - sq m): 1.57 sq meters (935)  Pulse: 79 (935)  BP: 104/65 (935)  Temp: 98.2 °F (36.8 °C) (935)  Do Not Use - Resp Rate: --  SpO2: 100 % (935)    Wt Readings from Last 6 Encounters:   25 61.7 kg (136 lb)   25 61.3 kg (135 lb 3.2 oz)   25 60.2 kg (132 lb 12.8 oz)   25 58.3 kg (128 lb 9.6 oz)    07/16/22 56.7 kg (125 lb)   07/05/22 61.7 kg (136 lb)       ECOG PS: 0    Physical Examination:  General: Patient is alert and oriented, not in acute distress  Psych: Mood and affect are appropriate  Eyes: EOMI, PERRL  ENT: Oropharynx is clear, no adenopathy  CV: Regular rate and rhythm, normal S1S2, no murmurs, no LE edema  Respiratory: Lungs clear to auscultation bilaterally  GI/Abd: Soft, non-tender with normoactive bowel sounds, no hepatosplenomegaly  Neurological: Grossly intact   Lymphatics: No palpable cervical, supraclavicular, axillary, or inguinal lymphadenopathy  Skin: no rashes or petechiae      Laboratory:  Lab Results   Component Value Date    WBC 7.5 04/16/2025    WBC 8.0 04/05/2025    WBC 6.5 02/15/2025    HGB 6.9 (LL) 04/16/2025    HGB 7.4 (L) 04/05/2025    HGB 8.1 (L) 02/15/2025    HCT 24.2 (L) 04/16/2025    MCV 64.9 (L) 04/16/2025    MCH 18.5 (L) 04/16/2025    MCHC 28.5 (L) 04/16/2025    RDW 18.1 (H) 04/16/2025    .0 04/16/2025    .0 04/05/2025    .0 02/15/2025     Lab Results   Component Value Date    GLU 81 07/16/2022    BUN 6 (L) 07/16/2022    BUNCREA 10.0 07/16/2022    CREATSERUM 0.60 07/16/2022    CREATSERUM 0.57 07/05/2022    CREATSERUM 0.64 10/08/2019    ANIONGAP 11 07/16/2022    GFRNAA 129 07/16/2022    GFRAA 149 07/16/2022    CA 9.3 07/16/2022    OSMOCALC 279 07/16/2022    ALKPHO 122 10/08/2019    AST 10 (L) 10/08/2019    ALT 18 10/08/2019    BILT 0.3 10/08/2019    TP 8.2 10/08/2019    ALB 4.2 10/08/2019    GLOBULIN 4.0 10/08/2019     07/16/2022    K 3.6 07/16/2022     07/16/2022    CO2 23.0 07/16/2022     No results found for: \"PTT\", \"PT\", \"INR\"    Imaging:    NA    Impression & Plan:     26 weeks pregnant, presenting with severe anemia likely secondary to iron deficiency.    Iron deficiency likely secondary to blood loss from menorrhagia, recurrent pregnancies.    - iron studies consistent with iron deficiency  - schedule 1500mg IV iron dextran, close  monitoring in infusion for reaction  - repeat CBC, iron, tibc, ferritin in 6 weeks to reassess iron stores    Given the Hb of 6.9, we will proceed with PRBC transfusion. Risks and benefits discussed.               This note was prepared using Dragon Medical voice recognition dictation software and as a result, errors may occur. When identified, these errors have been corrected. While every attempt is made to correct errors during dictation, discrepancies may still exist       Jovana Diaz MD  [facility]               [1]   Past Medical History:   Anemia    gestational    Decorative tattoo    Genital herpes simplex    Pregnancy (HCC)    Transfusion history   [2]   Past Surgical History:  Procedure Laterality Date          Leg/ankle surgery proc unlisted      felton in right leg   [3]    Ferrous Sulfate ER (SLOW FE) 45 MG Oral Tab CR Take by mouth.      Prenatal Multivit-Min-Fe-FA (PRENATAL VITAMINS OR) Take 1 tablet by mouth in the morning.     [4]   Current Outpatient Medications on File Prior to Visit   Medication Sig Dispense Refill    Ferrous Sulfate ER (SLOW FE) 45 MG Oral Tab CR Take by mouth.      Prenatal Multivit-Min-Fe-FA (PRENATAL VITAMINS OR) Take 1 tablet by mouth in the morning.       No current facility-administered medications on file prior to visit.   [5] No Known Allergies  [6]   Social History  Socioeconomic History    Marital status: Single   Tobacco Use    Smoking status: Never    Smokeless tobacco: Never   Substance and Sexual Activity    Alcohol use: No    Drug use: No     Social Drivers of Health     Food Insecurity: No Food Insecurity (2025)    NCSS - Food Insecurity     Worried About Running Out of Food in the Last Year: No     Ran Out of Food in the Last Year: No   Transportation Needs: No Transportation Needs (2025)    NCSS - Transportation     Lack of Transportation: No   Stress: No Stress Concern Present (2025)    Stress      Feeling of Stress : No   Housing Stability: Not At Risk (2/17/2025)    NCSS - Housing/Utilities     Has Housing: Yes     Worried About Losing Housing: No     Unable to Get Utilities: No   [7]   Family History  Problem Relation Age of Onset    No Known Problems Father     No Known Problems Mother

## 2025-04-16 NOTE — PROGRESS NOTES
Patient arrives to infusion for 1UPRBCs . Patient denies any issues or concerns. Blood products consent in chart. Explained to pt process for today's appt.      Ordering Provider: Joe  Order Exp: After this dose     Blood transfusion started at 1340.  At 1405 pt states that she feels the baby is moving more then usual and she had some contractions. Infusion stopped. notified NI Polk, NS fluids started per NI.  Dr Diaz and NI Polk assessed pt at chairside.   Per Dr Diaz restart blood at 120ml/hr  and recheck vitals.  Rechecked vitals, VSS. Pt states she feel better.     Upon completion of blood transfusion at 1622, pt states she felt one long contraction that lasted 3min with abdominal cramping. Pt states it's probably because of long sitting time she also had a full bladder. Notified NI Polk. Pt started on fluids, VSS. Pt went to the bathroom. Pt states she feels better, she doesn't feel contractions at this time. VSS. Felicitas DAN assessed pt at chair side.    Per NI Polk it's best for pt to go to the OB department and get checked up, pt declined going and states feels better. Per NI Polk pt OK to leave.     Pt verbalized understanding of calling the OB department if she gets more then 4 contractions in an hr or if she feels any new symptoms of pain or any concerns.    Patient appeared to tolerate infusion. No s/s of adverse reaction noted. VSS throughout and post infusion.     Reviewed next apt date/time: pt to follow up with provider    Education Record  Learner:  Patient  Disease / Diagnosis: Anemia in pregnancy  Barriers / Limitations:  None  Method:  Discussion, Printed material, and Reinforcement  General Topics:  Precautions, Side effects and symptom management, and Plan of care reviewed  Outcome:  Shows understanding

## 2025-04-23 NOTE — PROGRESS NOTES
See Prenatal Vitals flowsheet for documentation of vitals / urine / exam. See problem list -- updated in detail.   Used 20-29 min in order to do detailed chart review & review of blood work / ultrasound reports / MFM reports, reviewing history, performing prenatal exam / pelvic exam, counseling pt on vaccines, prenatal education, ordering tests or meds, ordering MFM /specialist referrals, documentation in EMR, interpretation / communication of results & care coordination    History of Present Illness  The patient, with a history of anemia, is planning a repeat  at 39 weeks. She has not yet re-scheduled a follow-up for her anemia, but plans to do ASAP. She has also been experiencing episodes of blurry vision and a fast heart rate, occurring approximately twice a week for the past three weeks. During these episodes, she feels as though her heart is pounding, but her pulse remains within normal limits. She has also experienced faintness during these episodes, with one episode lasting approximately an hour. Likely her severe anemia is causative.  No S/S of PTL.   In addition, the patient attempted a GTTfor diabetes, but vomited 15 minutes into the test. She has not experienced daily nausea and believes this to be a one-off event. She plans to reschedule the test.

## 2025-04-25 NOTE — PROGRESS NOTES
Pt is a 24 year old female admitted to [facility].     Chief Complaint   Patient presents with    R/o  Labor     Pt reports contractions every 10-15 minutes. Reports decreased FM      Pt is  28w1d intra-uterine pregnancy.  History obtained, consents signed. Oriented to room, staff, and plan of care.

## 2025-04-26 NOTE — TELEPHONE ENCOUNTER
On call--patient at [facility] [place] with PTCx.  FFN negative.  Cervix closed.  UA with possible UTI. Patient reports frequency and urgency. Macrobid to pharmacy

## 2025-04-26 NOTE — TRIAGE
[facility]  part of [facility]      Triage Note    Heike Ha Patient Status:  Outpatient    2000 MRN [id number]   Location [facility] Attending Milton Helton DO   Hosp Day # 0 PCP None Pcp      Para:   Estimated Date of Delivery: 25  Gestation: 28w1d    Chief Complaint    R/o  Labor         Allergies:  Allergies[1]    Orders Placed This Encounter   Procedures    Urinalysis, Routine    FETAL FIBRONECTIN       Lab Results   Component Value Date    WBC 7.5 2025    HGB 6.9 (LL) 2025    HCT 24.2 (L) 2025    .0 2025    CREATSERUM 0.60 2022    BUN 6 (L) 2022     2022    K 3.6 2022     2022    CO2 23.0 2022    GLU 81 2022    CA 9.3 2022    ALB 4.2 10/08/2019    ALKPHO 122 10/08/2019    BILT 0.3 10/08/2019    TP 8.2 10/08/2019    AST 10 (L) 10/08/2019    ALT 18 10/08/2019    LIP 25 10/01/2016    B12 281 2025    FFN Negative 2025       Clinitek UA  Lab Results   Component Value Date    URCOLOR Yellow 2017    URCLA Clear 2017    GLUUR Normal 2025    URINEBILI Negative 2017    URINEKETONE Negative 2017    SPECGRAVITY 1.011 2025    PHUR 7.0 2017    PROTURINE Negative 2017    UROBILI <2.0 2017    URINENITRITE Negative 2017    URINELEUK Trace (A) 2017    URINECUL 50,000-99,000 CFU/ML Alpha hemolytic Streptococcus (A) 02/15/2025       UA  Lab Results   Component Value Date    COLORUR Light-Yellow 2025    CLARITY Clear 2025    SPECGRAVITY 1.011 2025    PROUR Negative 2025    GLUUR Normal 2025    KETUR 20 (A) 2025    BILUR Negative 2025    BLOODURINE Negative 2025    NITRITE Negative 2025    UROBILINOGEN Normal 2025    LEUUR 250 (A) 2025    UASA Negative 2022       Vitals:    25 1900   BP:  114/68   Pulse: 95       NST  Variability: Moderate           Accelerations: Yes           Decelerations: None            Baseline: 140 BPM                       Contractions: Irregular                                        Acoustic Stimulator: No           Nonstress Test Interpretation: Appropriate for gestational age           Nonstress Test Second Interpretation: Appropriate for gestational age                        Additional Comments       Chief Complaint   Patient presents with    R/o  Labor     Pt reports contractions every 10-15 minutes. Reports decreased FM   'S on EFM. Per pt baby is moving well now. Contractions irregular less painful than they were in am today. NST reactive/appropriate for gestational age. SVE cx closed, thick. FFN negative, ua. Dr Helton called with results discharge order received. Instructions on kick counts,  labor, preeclampsia discussed with pt, written instructions given. Pt verbalize understanding.       Nazanin MORENO RN  2025 9:58 PM         [1] No Known Allergies

## 2025-05-14 NOTE — PROGRESS NOTES
Needs with tubal form signed next visit.desires to schedule repeat CS at next visit  +fetal movement, no lof or bleeding, some enma shaw  +hemorrhoids. No longer constipated. Using cream. Resolves with cream but hemorrhoids come back.  Taking iron.  Having iron infusion in 2 days.  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 Reviewed compression shorts and support band, epsom salts  Reviewed precautions, kick counts, sx of PTL  Rto 2 weeks

## 2025-05-16 NOTE — PROGRESS NOTES
Called to see pt in infusion. Receiving iron dextran infusion. Tolerated test dose without difficulty. C/o itching, infusion stopped. 176 mls infused. VS stable. C/o itching, no rash noted. No sob or resp difficulty. Pepcid 20 mg IV given. Pt c/o tightness across abd, baby active since she has been here. Having contractions at 115pm 30 sec. 115pm Itching is subsiding. Intermittent contractions continue. Pt seen by Dr Diaz transfer to OB for evaluation. 1:39pm

## 2025-05-16 NOTE — PROGRESS NOTES
Pt here for iron dextran 1500 mg . Pt denies any issues or concerns. Currently 31 weeks pregnant.     Test dose given. Patient tolerated well with no complaints.    Iron dextran started. After 156 mL patient complained of itchiness and contractions. Stopped infusion. Emmy Orozco/Dr. Diaz back to see patient. See reaction flowsheet.     Per Dr. Ball will send patient to [facility] to monitor baby.     Patient transferred via wheelchair to [facility]. Report given.

## 2025-05-16 NOTE — H&P
[facility]  part of [facility]    History & Physical    Heike Ha Patient Status:  Inpatient    2000 MRN [id number]   Location [facility] Attending Clotilde Patricia MD   Hosp Day # 0 PCP None Pcp     Date of Admission:  2025    SUBJECTIVE:    Heike Ha is a 24 year old , with EDC 2025, by Ultrasound giving EGA 31w1d, who is being admitted for observation.   Was getting IV iron infusion.  During infusion, started to get itching in chest and into neck.  Also started to have contractions.   Was sent to triage     Received IV benadryl for itching.     Blood:  O+      Problem List:   Patient Active Problem List    Diagnosis    Genital herpes affecting pregnancy (HCC)     Valtrex at 36 weeks until delivery -- 500 mg po bid  CSx if active lesion at time of CSx         contractions (HCC)    Iron deficiency anemia    Ángel Mcdaniel contractions (HCC)    Uterine contractions at greater than 20 weeks of gestation (HCC)    Transfer of care     Transfer at 18 wks from [facility] due to wanting permanent sterilization      History of      Wants repeat  with BS  Had 2  and  due to cord prolapse in 2024      Antepartum anemia (HCC)     2025: received unit of blood per hematology    Pt has not been taking iron.   Will take oral iron.  If no improvement, then referral to hematology    Recent Labs     02/15/25  1212 25  0806 25  1019   RBC 4.09 3.94 3.73*   HGB 8.1* 7.4* 6.9*   HCT 26.9* 25.8* 24.2*   MCV 65.8* 65.5* 64.9*   MCH 19.8* 18.8* 18.5*   MCHC 30.1* 28.7* 28.5*   RDW 19.0* 18.1* 18.1*   NEPRELIM 4.42  --  5.00   WBC 6.5 8.0 7.5   .0 310.0 270.0           Lab Results   Component Value Date    JEANCARLOS 1 (L) 2025    JEANCARLOS 2 (L) 02/15/2025       If Hb less than 11, start iron & then recheck CBC/ferritin in one month    Decreased MCV, try iron x one month, repeat CBC.  If not improved  significantly, then iron studies (ferritin, TIBC), Hb electorphoresis    Normal MCV: Hb electrophoresis    Increased MCV: check for vit B12, folate    If Hb<8, MFM & heme consults and consider transfusion           Obstetric History:   OB History    Para Term  AB Living   4 3 3   3   SAB IAB Ectopic Multiple Live Births       3      # Outcome Date GA Lbr Kris/2nd Weight Sex Type Anes PTL Lv   4 Current            3 Term 24 37w3d  6 lb 9 oz (2.977 kg) M Caesarean  N ADRIANNA      Complications: Cord prolapse   2 Term 23 39w0d  6 lb 11 oz (3.033 kg) F NORMAL SPONT  N ADRIANNA   1 Term 17 37w3d  6 lb 3 oz (2.807 kg) F NORMAL SPONT  N ADRIANNA     Past Medical History:   Past Medical History:    Anemia    gestational    Decorative tattoo    Genital herpes simplex    Pregnancy (HCC)    Transfusion history     Past Social History:   Past Surgical History:   Procedure Laterality Date          Leg/ankle surgery proc unlisted      felton in right leg     Family History:   Family History   Problem Relation Age of Onset    No Known Problems Father     No Known Problems Mother      Social History:   Social History     Tobacco Use    Smoking status: Never    Smokeless tobacco: Never   Substance Use Topics    Alcohol use: No       Home Meds:   Medications Prior to Admission   Medication Sig Dispense Refill Last Dose/Taking    Ferrous Sulfate ER (SLOW FE) 45 MG Oral Tab CR Take by mouth.   2025    Prenatal Multivit-Min-Fe-FA (PRENATAL VITAMINS OR) Take 1 tablet by mouth in the morning.   2025    [] nitrofurantoin monohydrate macro (MACROBID) 100 MG Oral Cap Take 1 capsule (100 mg total) by mouth 2 (two) times daily for 7 days. 14 capsule 0        Allergies: No Known Allergies      OBJECTIVE:    Temp:  [97.7 °F (36.5 °C)-97.9 °F (36.6 °C)] 97.9 °F (36.6 °C)  Pulse:  [] 104  Resp:  [16] 16  BP: ()/(42-65) 92/42  SpO2:  [97 %-98 %] 98 %      General: Alert and Oriented  Abdomen:  Soft, Gravid    Cervix  50/-3 per RN  Ctx q 3 min    Fetal heart tones:  Baseline 150   Fetal heart variability: moderate and reactive  Decelerations: No      Lab Review:  Results for orders placed or performed during the hospital encounter of 25   Urinalysis with Culture Reflex    Collection Time: 25  2:24 PM    Specimen: Urine, clean catch   Result Value Ref Range    Urine Color Yellow Yellow    Clarity Urine Clear Clear    Spec Gravity 1.028 1.005 - 1.030    Glucose Urine Normal Normal mg/dL    Bilirubin Urine Negative Negative    Ketones Urine Negative Negative mg/dL    Blood Urine Negative Negative    pH Urine 7.0 5.0 - 8.0    Protein Urine Negative Negative mg/dL    Urobilinogen Urine Normal Normal    Nitrite Urine Negative Negative    Leukocyte Esterase Urine 500 (A) Negative    WBC Urine 11-20 (A) 0 - 5 /HPF    RBC Urine 0-2 0 - 2 /HPF    Bacteria Urine None Seen None Seen /HPF    Squamous Epi. Cells Few (A) None Seen /HPF    Renal Tubular Epithelial Cells None Seen None Seen /HPF    Transitional Cells None Seen None Seen /HPF    Yeast Urine None Seen None Seen /HPF   FETAL FIBRONECTIN    Collection Time: 25  5:00 PM   Result Value Ref Range    Fetal Fibrinectin Negative Negative          ASSESSMENT:    1.  31 1/7 week IUP with threatened  labor      PLAN:    Spoke with M Dr Zaidi.  Admit for observation.  Nifedipine.  Steroids.          Clotilde Patricia MD  2025  6:47 PM

## 2025-05-17 NOTE — DISCHARGE INSTRUCTIONS
Call if increased pain, bleeding or oral temperature > 100.3. Nothing in vagina. No intercourse.

## 2025-05-17 NOTE — PROGRESS NOTES
[facility]  part of [facility]    Antepartum Progress Note    Heike Ha Patient Status:  Inpatient    2000 MRN [id number]   Location [facility] Attending Elvis Mak DO   Hosp Day # 1 PCP None Pcp       Subjective   Interval History:   Threatened  labor admission. On Procardia 10 mg q 6. Still with some contractions but less intensity intermittently.     Objective   Vital signs in last 24 hours:  Temp:  [97.9 °F (36.6 °C)-98.6 °F (37 °C)] 98.4 °F (36.9 °C)  Pulse:  [] 96  Resp:  [16-18] 16  BP: ()/(42-62) 102/61    Input/Output:  No intake or output data in the 24 hours ending 25 1325    FHT assessment:   Baseline: 145 bpm   Variability: moderate   Accels:  present   Decels: No   Tocos:  appears irregular mixed with irritability   Category: 1 tracing    Sterile vaginal exam:  Dilation: 1 cm external and closed internally, yet q 4-8 per patient  Effacement: 50%   Station: -3   Position: Cephalic    Results:       Assessment/Plan     Threatened  labor    PLAN: Second dose steroids and then stop Nifedipine. Will likely go home on PO hydration and rest.           Plan discussed with patient who verbalizes understanding and agreement.    Elvis Mak,   2025

## 2025-05-17 NOTE — DISCHARGE SUMMARY
[facility]  Discharge Summary    Heike Ha Patient Status:  Inpatient    2000 MRN [id number]   Location [facility] Attending Elvis Mak, DO   Hosp Day # 1 PCP None Pcp           Date of Admission: 2025 Disposition: Home or Self Care     Date of Discharge: 2025      Admitting Diagnosis: Threatened  labor      Discharge Diagnosis: Same      Reason for Admission: Observation, Betamethasone and oral Nifedipine    Hospital Course: uncomplicated    Procedures: none    Complications: none apparent    Discharge Condition: Stable    Discharge Medications:   Current Discharge Medication List        Home Meds - Unchanged    Details   Ferrous Sulfate ER (SLOW FE) 45 MG Oral Tab CR Take by mouth.      Prenatal Multivit-Min-Fe-FA (PRENATAL VITAMINS OR) Take 1 tablet by mouth in the morning.                 Follow up Visits: Follow-up with Dr Helton in 10 days as scheduled      Other Discharge Instructions: Call if increased pain, bleeding or oral temperature > 100.3. Nothing in vagina. No intercourse.     Elvis Mak  2025  6:30 PM

## 2025-05-18 NOTE — PROGRESS NOTES
Patient discharged in stable condition by private car with significant other. Patient provided with discharge education and follow up instruction; patient stated understanding of education and all questions answered; patient escorted from unit by RN.

## 2025-05-19 NOTE — TELEPHONE ENCOUNTER
Spoke to patient. Advised will send message to MD on call but if she doesn't hear back from me can talk to Dr. Helton about at her appointment next week. Patient verbalized understandings

## 2025-05-27 NOTE — TELEPHONE ENCOUNTER
Patient is calling and state she had an allergic reaction to the iron infusion, and she is asking what she is to do moving forward since her iron is very low and her OB wants her to be prepared for her  that is scheduled for July 10. Please contact patient.

## 2025-05-27 NOTE — PROGRESS NOTES
Feeling well.  Good fetal movement.  Had allergic rxn to IV iron in infusion center last week.  Had [facility] and overnight obs.  Feeling well now. Patient needs to call heme office today to determine POC given allergic rxn.  RCS/BS order sent.  30d sterliization paperwork signed and given to Natalia.  RTC 2 wks.       See Prenatal Vitals flowsheet for documentation of vitals / urine / exam. See problem list -- updated in detail.   Used 20-29 min in order to do detailed chart review & review of blood work / ultrasound reports / MFM reports, reviewing history, performing prenatal exam / pelvic exam, counseling pt on vaccines, prenatal education, ordering tests or meds, ordering MFM /specialist referrals, documentation in EMR, interpretation / communication of results & care coordination

## 2025-05-27 NOTE — TELEPHONE ENCOUNTER
OB GYN SURGICAL SCHEDULING    Assessment:  previous CSection, Desires sterilization    Operative Procedure: repeat  section with b/l salpingectomy    In / on: 39 weeks    Estimated Date of Delivery: 25    Date:  7/10/25 with MD on call     Admission: am admit     Anesthesia: spinal    Additional Orders:  routine    Comments / Orders to Nurse: 30 day sterilization paperwork signed in office on 25 appointment

## 2025-05-27 NOTE — TELEPHONE ENCOUNTER
Called and spoke with Heike. Told her Dr. Diaz and her NP, Felicitas FORBES are out of the office today, but the physician assistant covering for her stated to let her know there are alternate IV iron options and will wait until Dr. Diaz is back in office to decide next steps. Told her someone from our office will get back to her tomorrow. She verbalized understanding and thanked me for the call back.

## 2025-05-27 NOTE — TELEPHONE ENCOUNTER
Patient is 32 weeks pregnant and is scheduled for   . Patient still has not seen Dr. yLnn. Available appointment times with Dr. Lynn do not work with patient's schedule. Patient is wondering if there is anywhere in the late afternoon in Dr. Lynn's schedule she can be fit in for the week of  or . Please advise.

## 2025-05-27 NOTE — TELEPHONE ENCOUNTER
Spoke to patient. Aware section is scheduled on Thurs,7/10/25 at 130pm with Dr. Patricia    Reached out to [place] for assist    Labor and delivery instructions sent, antimicrobial wash instructions sent , and enhanced recovery instructions sent. Via [employer]    Delayed staff message sent to MD to please place pre-op orders    Delayed staff message sent to RN pool to please place pre-op orders    Entered in book and calendar

## 2025-06-09 NOTE — PROGRESS NOTES
Declined Tdap.  RTC 2 week  See Prenatal Vitals flowsheet for documentation of vitals / urine / exam. See problem list -- updated in detail.   Used 20-29 min in order to do detailed chart review & review of blood work / ultrasound reports / MFM reports, reviewing history, performing prenatal exam / pelvic exam, counseling pt on vaccines, prenatal education, ordering tests or meds, ordering MFM /specialist referrals, documentation in EMR, interpretation / communication of results & care coordination

## 2025-06-26 NOTE — TELEPHONE ENCOUNTER
----- Message from Natalia CROCKETT sent at 2025  1:09 PM CDT -----  Regarding: repeat  section with b/l salpingectomy  Please place pre-op orders for section scheduled on Thurs,7/10

## 2025-06-30 NOTE — ANESTHESIA PROCEDURE NOTES
Spinal Block    Date/Time: 6/30/2025 3:49 PM    Performed by: Lynda Ro CRNA  Authorized by: James Shea MD      General Information and Staff    Start Time:  6/30/2025 3:49 PM  End Time:  6/30/2025 3:51 PM  Anesthesiologist:  aJy Pfeiffer MD  CRNA:  Lynda Ro CRNA  Performed by:  CRNA  Patient Location:  OR  Site identification: surface landmarks    Reason for Block: at surgeon's request, post-op pain management and surgical anesthesia      Procedure Details    Patient Position:  Sitting  Prep: ChloraPrep and patient draped    Monitoring:  Continuous pulse ox, cardiac monitor and heart rate  Approach:  Midline  Location:  L3-4  Injection Technique:  Single-shot    Needle    Needle Type:  Pencil-tip  Needle Gauge:  24 G  Needle Length:  3.5 in    Assessment    Sensory Level:  T8  Events: clear CSF, CSF aspirated, well tolerated and blood negative      Additional Comments

## 2025-06-30 NOTE — ANESTHESIA PREPROCEDURE EVALUATION
Anesthesia PreOp Note    HPI:     Heike Ha is a 24 year old female who presents for preoperative consultation requested by: Elvis Mak DO    Date of Surgery: 2025    Procedure(s):   SECTION WITH BILATERAL SALPINGECTOMY  BILATERAL SALPINGECTOMY  Indication: Repeat with bilateral salpingectomy    Relevant Problems   No relevant active problems       NPO:  Last Liquid Consumption Date: 25     Last Solid Consumption Date: 25  Last Solid Consumption Time: 930  Last Liquid Consumption Date: 25          History Review:  Patient Active Problem List    Diagnosis Date Noted    Previous  delivery affecting pregnancy ([facility]) 2025    Onset (spontaneous) of labor after 37 completed weeks of gestation but before 39 completed weeks gestation, with delivery by (planned)  section ([facility]) 2025    Threatened premature labor in third trimester ([facility]) 2025    Genital herpes affecting pregnancy ([facility]) 2025     contractions ([facility]) 2025    Iron deficiency anemia 2025    Transfer of care 2025    History of  2025    Antepartum anemia ([facility]) 2025       Past Medical History[1]    Past Surgical History[2]    Prescriptions Prior to Admission[3]  Current Medications and Prescriptions Ordered in Epic[4]    Allergies[5]    Family History[6]  Social Hx on file[7]    Available pre-op labs reviewed.  Lab Results   Component Value Date    WBC 6.4 2025    RBC 4.01 2025    HGB 9.3 (L) 2025    HCT 30.1 (L) 2025    MCV 75.1 (L) 2025    MCH 23.2 (L) 2025    MCHC 30.9 (L) 2025    RDW 28.8 (H) 2025    .0 2025             Vital Signs:  There is no height or weight on file to calculate BMI.   oral temperature is 97.7 °F (36.5 °C). Her blood pressure is 107/64 and her pulse is 80. Her respiration is 16.   Vitals:    25 1115   BP: 107/64   Pulse: 80   Resp: 16   Temp: 97.7  °F (36.5 °C)   TempSrc: Oral        Anesthesia Evaluation     Patient summary reviewed and Nursing notes reviewed    Airway   Mallampati: I  TM distance: >3 FB  Neck ROM: full  Dental - Dentition appears grossly intact     Pulmonary - negative ROS and normal exam   Cardiovascular - negative ROS and normal exam    Neuro/Psych - negative ROS     GI/Hepatic/Renal - negative ROS     Endo/Other - negative ROS   Abdominal  - normal exam                 Anesthesia Plan:   ASA:  2  Emergent    Plan:   Spinal  Post-op Pain Management: Intrathecal narcotics and IV analgesics  Informed Consent Plan and Risks Discussed With:  Patient  Use of Blood Products Discussed With:  Patient      I have informed Heike Ha and/or legal guardian or family member of the nature of the anesthetic plan, benefits, risks including possible dental damage if relevant, major complications, and any alternative forms of anesthetic management.   All of the patient's questions were answered to the best of my ability. The patient desires the anesthetic management as planned.  GERMAN SAUCEDA MD  2025 3:04 PM  Present on Admission:   Previous  delivery affecting pregnancy ([facility])   Onset (spontaneous) of labor after 37 completed weeks of gestation but before 39 completed weeks gestation, with delivery by (planned)  section ([facility])           [1]   Past Medical History:   Anemia    gestational    Decorative tattoo    Genital herpes simplex    Pregnancy ([facility])    Transfusion history    Blood and iron transfusions   [2]   Past Surgical History:  Procedure Laterality Date          Leg/ankle surgery proc unlisted      felton in right leg   [3]   Medications Prior to Admission   Medication Sig Dispense Refill Last Dose/Taking    valACYclovir 500 MG Oral Tab Take by mouth 2 (two) times daily.   Taking    Ferrous Sulfate ER (SLOW FE) 45 MG Oral Tab CR Take by mouth.       Prenatal Multivit-Min-Fe-FA (PRENATAL VITAMINS OR) Take 1  tablet by mouth in the morning.      [4]   Current Facility-Administered Medications Ordered in Epic   Medication Dose Route Frequency Provider Last Rate Last Admin    lactated ringers IV bolus 1,000 mL  1,000 mL Intravenous Once Elvis Mak, DO        Followed by    lactated ringers infusion  125 mL/hr Intravenous Continuous Elvis Mak A, DO        ondansetron (Zofran) 4 MG/2ML injection 4 mg  4 mg Intravenous Q6H PRN Elvis Mak A, DO        sodium citrate-citric acid (Bicitra) 500-334 MG/5ML oral solution 30 mL  30 mL Oral Once Elvis Mak A, DO        oxyTOCIN in sodium chloride 0.9% (Pitocin) 30 Units/500mL infusion premix  62.5-900 roxy-units/min Intravenous Continuous Elvis Mak A, DO        famotidine (Pepcid) tab 20 mg  20 mg Oral Once Elvis Mak A, DO        Or    famotidine (Pepcid) 20 mg/2mL injection 20 mg  20 mg Intravenous Once Elvis Mak A, DO        metoclopramide (Reglan) tab 10 mg  10 mg Oral Once Elvis Mak A, DO        Or    metoclopramide (Reglan) 5 mg/mL injection 10 mg  10 mg Intravenous Once Elvis Mak A, DO        ceFAZolin (Ancef) 2g in 10mL IV syringe premix  2 g Intravenous Once Elvis Mak A, DO        azithromycin (Zithromax) 500 mg in sodium chloride 0.9% 250mL IVPB premix  500 mg Intravenous Once Elvis Mak, DO         No current [facility]-ordered outpatient medications on file.   [5] No Known Allergies  [6]   Family History  Problem Relation Age of Onset    No Known Problems Father     No Known Problems Mother    [7]   Social History  Socioeconomic History    Marital status: Single   Tobacco Use    Smoking status: Never    Smokeless tobacco: Never   Vaping Use    Vaping status: Never Used   Substance and Sexual Activity    Alcohol use: No    Drug use: No

## 2025-06-30 NOTE — PROGRESS NOTES
Pt is a 24 year old female admitted to TR4/TR4-A.     Chief Complaint   Patient presents with    R/o Labor      Pt is  37w4d intra-uterine pregnancy.  History obtained, consents signed. Oriented to room, staff, and plan of care.

## 2025-06-30 NOTE — H&P
[facility]  part of [facility]    History & Physical    Heike Ha Patient Status:  Inpatient    2000 MRN [id number]   Location [facility] Attending Elvis Mak, DO   Hosp Day # 0 PCP None Pcp     Date of Admission:  2025      HPI:   Heike Ha is a 24 year old  female, current EGA of 37w4d with an estimated date of delivery of: 2025, by Ultrasound      Heike Ha is being admitted for repeat  section in labor.   Her current obstetrical history is significant for severe anemia. She sees Hematology.  She reports contractions since earlier AM and ? Leakage of fluid. SROM ruled out by Triage RN. Noted to be [place] with moderate intensity enough to breathe through. She was scheduled for  section / salpingectomy on 07-10 at 39 weeks. She had no visit since 34 weeks so group beta strep not done. She had normal level 2. She is Rh positive, rubella immune, serology negative and gbs unknown. No HTN  Patient reports contractions since early AM .     Fetal Movement: good    History   Obstetric History:   OB History    Para Term  AB Living   4 3 3   3   SAB IAB Ectopic Multiple Live Births       3      # Outcome Date GA Lbr Kris/2nd Weight Sex Type Anes PTL Lv   4 Current            3 Term 24 37w3d  6 lb 9 oz (2.977 kg) M Caesarean  N ADRIANNA      Complications: Cord prolapse   2 Term 23 39w0d  6 lb 11 oz (3.033 kg) F NORMAL SPONT  N ADRIANNA   1 Term 17 37w3d  6 lb 3 oz (2.807 kg) F NORMAL SPONT  N ADRIANNA     Past Medical History:   Past Medical History:    Anemia    gestational    Decorative tattoo    Genital herpes simplex    Pregnancy (HCC)    Transfusion history     Past Surgerical History:   Past Surgical History:   Procedure Laterality Date          Leg/ankle surgery proc unlisted      felton in right leg     Social History:   Social History     Tobacco Use    Smoking status:  Never    Smokeless tobacco: Never   Substance Use Topics    Alcohol use: No        Allergies/Medications:   Allergies:   No Known Allergies  Medications:  Medications Prior to Admission   Medication Sig Dispense Refill Last Dose/Taking    Ferrous Sulfate ER (SLOW FE) 45 MG Oral Tab CR Take by mouth.       Prenatal Multivit-Min-Fe-FA (PRENATAL VITAMINS OR) Take 1 tablet by mouth in the morning.          Review of Systems:   As documented in HPI      Physical Exam:   Temp:  [97.7 °F (36.5 °C)] 97.7 °F (36.5 °C)  Pulse:  [80] 80  Resp:  [16] 16  BP: (107)/(64) 107/64    Constitutional: alert and cooperative in mild distress  Abdomen: gravid nontender  Vaginal exam:  Dilation: 2.5 cm    Effacement: 50 %    Station: -3    Position: Cephalic    Triage RN exam    FHT assessment:   Baseline: 130 bpm   Variability: moderate   Accels:  present   Decels: No   Tocos:  contractions every 3-5 minute   Category: 1 tracing    Results:     O positive with negative Ab screen  H/H = 9.3g / 30% and platelets = 222K  Trep NR       Assessment/Plan:     Previous  delivery affecting pregnancy (HCC)        Onset (spontaneous) of labor after 37 completed weeks of gestation but before 39 completed weeks gestation, with delivery by (planned)  section (HCC)    Latent phase labor    Treatment Plan:  Intervention: Repeat  section with bilateral salpingectomy. The procedure with it's indications, risks and complications was discussed. These include but are not limited to: contraceptive failure, bleeding, infection, injury and VTE. Any of these could require further medical and / or surgical therapy. We discussed prophylactic measures as well. Questions answered and consent obtained. Lebron sims for discussion.       Elvis Mak DO  2025  1:01 PM

## 2025-06-30 NOTE — ANESTHESIA POSTPROCEDURE EVALUATION
Patient: Heike Cancino Ha    Procedure Summary       Date: 25 Room / Location: [facility] L+D OR  [facility] L+D OR    Anesthesia Start: 154 Anesthesia Stop:     Procedures:        SECTION WITH BILATERAL SALPINGECTOMY (Abdomen)      BILATERAL SALPINGECTOMY (Abdomen) Diagnosis: (Repeat with bilateral salpingectomy)    Surgeons: Elvis Mak DO Anesthesiologist: German Pfeiffer MD    Anesthesia Type: spinal ASA Status: 2 - Emergent            Anesthesia Type: spinal    Vitals Value Taken Time   /147 25 17:10   Temp 98 °F (36.7 °C) 25 17:11   Pulse 73 25 17:11   Resp 19 25 17:11   SpO2 100 % 25 17:11   Vitals shown include unfiled device data.    [facility] AN Post Evaluation:   Patient Evaluated in PACU  Patient Participation: complete - patient participated  Level of Consciousness: awake  Pain Score: 0  Pain Management: adequate  Airway Patency:patent  Dental exam unchanged from preop  Yes    Nausea/Vomiting: none  Cardiovascular Status: acceptable  Respiratory Status: acceptable  Postoperative Hydration acceptable      GERMAN PFEIFFER MD  2025 5:11 PM

## 2025-07-01 NOTE — PROGRESS NOTES
[facility]  part of [facility]    OB/Gyne Post  Section Progress Note      Heike Ha Patient Status:  Inpatient    2000 MRN [id number]   Location [facility] 3SE Attending Elvis Mak, DO   Hosp Day # 1 PCP None Pcp       Subjective     Good pain control. Tolerating present diet. Ambulating well. Awaiting spontaneous void. She reports that she is starting to get left calf pain.    Objective   Vital signs in last 24 hours:  Vitals:    25 0555 25 0813 25 1000 25 1323   BP: 95/56 108/83 105/69 106/72   Pulse: (!) 49 57 56 61   Resp: 15 16 16 16   Temp: 97.8 °F (36.6 °C) 97.8 °F (36.6 °C) 97.7 °F (36.5 °C) 97.8 °F (36.6 °C)   TempSrc: Oral Oral Oral Oral   SpO2:  98%     Weight:            Input/Output:    Intake/Output Summary (Last 24 hours) at 2025 1329  Last data filed at 2025 0951  Gross per 24 hour   Intake 3101.25 ml   Output 2809 ml   Net 292.25 ml       Constitutional: comfortable  Abdomen: soft nontender  Uterus: fundus below umbilicus, non tender  Wound/Incision:  Clean / dry / intact  Extremities: No calf tenderness, SCDs on while in bed, No pitting edema.      Results:   Labs / Path / Radiology:    Recent Results (from the past 24 hours)   Prepare RBC STAT    Collection Time: 25  3:46 PM   Result Value Ref Range    Blood Product [id number]     Unit Number [id number]     UNIT ABO O     UNIT RH POS     Crossmatch Compatible     Product Status Cross Matched     Expiration Date [id number]     Blood Type Barcode 5100     Unit Volume 350 ml   CBC With Differential With Platelet    Collection Time: 25  7:35 PM   Result Value Ref Range    WBC 14.4 (H) 4.0 - 11.0 x10(3) uL    RBC 3.69 (L) 3.80 - 5.30 x10(6)uL    HGB 8.3 (L) 12.0 - 16.0 g/dL    HCT 26.9 (L) 35.0 - 48.0 %    MCV 72.9 (L) 80.0 - 100.0 fL    MCH 22.5 (L) 26.0 - 34.0 pg    MCHC 30.9 (L) 31.0 - 37.0 g/dL    RDW-SD 73.9 (H) 35.1 - 46.3 fL    RDW 29.0  (H) 11.0 - 15.0 %    .0 150.0 - 450.0 10(3)uL    Neutrophil Absolute Prelim 13.03 (H) 1.50 - 7.70 x10 (3) uL    Neutrophil Absolute 13.03 (H) 1.50 - 7.70 x10(3) uL    Lymphocyte Absolute 1.09 1.00 - 4.00 x10(3) uL    Monocyte Absolute 0.26 0.10 - 1.00 x10(3) uL    Eosinophil Absolute 0.00 0.00 - 0.70 x10(3) uL    Basophil Absolute 0.01 0.00 - 0.20 x10(3) uL    Immature Granulocyte Absolute 0.05 0.00 - 1.00 x10(3) uL    Neutrophil % 90.3 %    Lymphocyte % 7.5 %    Monocyte % 1.8 %    Eosinophil % 0.0 %    Basophil % 0.1 %    Immature Granulocyte % 0.3 %   CBC With Differential With Platelet    Collection Time: 25  6:15 AM   Result Value Ref Range    WBC 9.5 4.0 - 11.0 x10(3) uL    RBC 3.19 (L) 3.80 - 5.30 x10(6)uL    HGB 7.3 (L) 12.0 - 16.0 g/dL    HCT 24.0 (L) 35.0 - 48.0 %    MCV 75.2 (L) 80.0 - 100.0 fL    MCH 22.9 (L) 26.0 - 34.0 pg    MCHC 30.4 (L) 31.0 - 37.0 g/dL    RDW-SD 74.3 (H) 35.1 - 46.3 fL    RDW 28.4 (H) 11.0 - 15.0 %    .0 150.0 - 450.0 10(3)uL    Neutrophil Absolute Prelim 7.12 1.50 - 7.70 x10 (3) uL    Neutrophil Absolute 7.12 1.50 - 7.70 x10(3) uL    Lymphocyte Absolute 1.88 1.00 - 4.00 x10(3) uL    Monocyte Absolute 0.48 0.10 - 1.00 x10(3) uL    Eosinophil Absolute 0.00 0.00 - 0.70 x10(3) uL    Basophil Absolute 0.02 0.00 - 0.20 x10(3) uL    Immature Granulocyte Absolute 0.04 0.00 - 1.00 x10(3) uL    Neutrophil % 74.7 %    Lymphocyte % 19.7 %    Monocyte % 5.0 %    Eosinophil % 0.0 %    Basophil % 0.2 %    Immature Granulocyte % 0.4 %         No results found.      Assessment/Plan   POD# 1 with following issues:   Patient Active Problem List   Diagnosis    Transfer of care    History of     Antepartum anemia (HCC)    Iron deficiency anemia     contractions (HCC)    Genital herpes affecting pregnancy (HCC)    Threatened premature labor in third trimester (HCC)    Previous  delivery affecting pregnancy (HCC)    Onset (spontaneous) of labor after 37  completed weeks of gestation but before 39 completed weeks gestation, with delivery by (planned)  section (HCC)   .    CPM, ambulate  LE doppler      Brandee Quiroga MD  2025  1:29 PM

## 2025-07-01 NOTE — PROGRESS NOTES
[facility]  part of [facility]     Section Delivery / Brief Operative Note    Heike Ha Patient Status:  Inpatient    2000 MRN [id number]   Location [facility] 3SE Attending Elvis Mak DO   Hosp Day # 0 PCP None Pcp     Pre Op Diagnosis:  IUP at 37 wks, latent phase labor, previous  section, desired sterilization    Post Op Diagnosis:  same as pre-op    Procedure:  repeat  LTCS with bilateral salpingectomy    Surgeon:  Elvis Mak DO    Assistant Surgeon:  Dr Espinoza     Anesthesia: spinal    Complications: none    Antibiotics:  Ancef 2 grams and Azithromycin 500 mg preoperatively    QBL: 1184 ml    Findings: normal uterus, normal tubes bilaterally, normal ovaries bilaterally. Live female infant, Apgars 8 & 9, weight 6 lbs, 11 oz delivered at 1619 in ROP position. Nuchal Cord:  none  clear amniotic fluid noted.  Josselin Vilchis    Sponge and Needle Counts:  Verified    Dictation # [id number]      Elvis Mak DO  2025  11:01 PM

## 2025-07-01 NOTE — PROGRESS NOTES
Patient transferred to mother/baby room 357 per cart in stable condition with baby and personal belongings.  Accompanied by  and staff.  Report given to mother/baby RN.

## 2025-07-01 NOTE — OPERATIVE REPORT
[facility]    PATIENT'S NAME: PAULY CERDA   ATTENDING PHYSICIAN: Elvis Lomeli DO   OPERATING PHYSICIAN: Elvis Lomeli DO   PATIENT ACCOUNT#:   [id number]    LOCATION:  [address] [facility]  MEDICAL RECORD #:   [id number]       YOB: 2000  ADMISSION DATE:       2025      OPERATION DATE:  2025    OPERATIVE REPORT      PREOPERATIVE DIAGNOSIS:  Pregnancy at 37 weeks with latent phase labor, previous  section, and desires sterilization.  POSTOPERATIVE DIAGNOSIS:  Pregnancy at 37 weeks with latent phase labor, previous  section, and desires sterilization, delivery of a viable female infant.  PROCEDURE:  Repeat low transverse  section with bilateral salpingectomy.    ASSISTANT SURGEON:  Negra Espinoza MD.    ANESTHESIA:  Spinal.    QUANTITATIVE BLOOD LOSS:  1184 mL.    COMPLICATIONS:  None apparent.    PATHOLOGY SPECIMEN:  Placenta and cord.    FINDINGS:  There was normal uterus, tubes, and ovaries noted.  There was a live female infant delivered weighing 6 pounds 11 ounces with Apgar scores of 8 and 9 at one and five minutes, respectively.  Baby was in right occiput posterior position.  There was no nuchal cord.  Amniotic fluid was clear.    OPERATIVE TECHNIQUE:  After consent was obtained, patient was taken to the operating suite where a spinal anesthetic was administered, and she was prepped and draped in the usual fashion.  Next, a Pfannenstiel incision was made excising the previous scar.  This was taken down to the rectus fascia which was incised and carried to the limits of the skin incision.  Fascia was sharply and bluntly dissected away from the muscles which was split at the midline exposing the peritoneum.  Peritoneal cavity was entered bluntly and extended bluntly as well.  The vesicouterine peritoneal reflection was identified, and we went approximately 3 cm superior to this.  A transverse incision was made with a second knife.  This was  carried down to the bag of water and the incision was extended bluntly.  We did artificial rupture of membranes with an Allis clamp.  Clear fluid was noted.  Fetal head was palpated in right occiput posterior position and rotated to right occiput anterior.  Head was then delivered with fundal pressure.  Oropharyngeal and nasal secretions were aspirated with a bulb syringe.  The remainder of the baby was delivered with fundal pressure.  Oropharyngeal and nasal secretions were aspirated again.  The baby was vigorous, and we did a 30-second delayed cord clamp.  The cord was then doubly clamped, cut.  The infant transferred to the  team.  A segment of cord was isolated for cord pH determination as this was an unscheduled .  Next, we did routine cord blood sample, and the placenta was manually extracted.  The uterine cavity was swabbed of any excess clot and membranous fragments until clear.  The uterus was then closed with a running lock suture of #1 chromic.  A second layer was placed in an imbricating fashion.  Good hemostasis was observed.  We then switched attention to the salpingectomy.  The left tube was isolated and carried out its fimbriated end.  It was then excised using a LigaSure handheld device.  No bleeding was noted.  We then repeated this on the right fallopian tube.  The surgical sites were dry.  We then went back to the incision, and there was a small area of bleeding on the right corner.  I had used 3 interrupted sutures using 2-0 chromic x2 and then a single suture of #1 chromic.  Hemostasis was observed.  We swabbed and irrigated the cul-de-sac and paracolic gutters.  We then examined uterine incision with no bleeding noted.  I then placed a layer of Interceed over the incision.  The peritoneum was then closed with a running suture of #1 chromic.  This was followed by examination of the rectus musculature with no bleeding noted.  We then closed the fascia with a running suture of 0  Vicryl.  Subcutaneous tissue was irrigated with no bleeding seen here and it was reapproximated using a running suture of 2-0 plain gut, and lastly we performed skin closure with a running subcuticular suture of 4-0 Monocryl.  All final counts were noted to be correct.  The incision was covered with Dermabond and Tegaderm.  The patient was then taken to postanesthesia care unit in stable condition.    Dictated By Elvis Lomeli DO  d: 06/30/2025 23:11:45  t: 06/30/2025 23:23:35  [facility] [id number]  [facility]/    cc: DO Negra French MD

## 2025-07-01 NOTE — DISCHARGE INSTRUCTIONS
Discharge Instructions for  Section ()  You had a  section, or . During the , your baby was delivered through a surgical incision in your stomach and uterus. Full recovery after a  can take time. It’s important to take care of yourself -- for your own sake and because your new baby needs you. Here are some guidelines to follow at home.  Incision care  Here's how to take care of your incision:  Shower as needed. Pat your incision dry.  Watch your incision for signs of infection, like increasing redness or drainage.  Hold a pillow against the incision when you laugh or cough and when you get up from a lying or sitting position.  Remember, it can take as long as 6 weeks for a  incision to heal.  Activity  Here are some suggestions:  Don’t try to take care of anyone other than your baby and yourself.  Remember, the more active you are, the more likely you are to have an increase in your bleeding.  Get lots of rest. Take naps in the afternoon.  Increase your activities gradually.  Plan your activities so that you don’t have to go up or down stairs more than necessary.  Do postsurgical deep breathing and coughing exercises. Ask your healthcare provider for instructions.  Don’t lift anything heavier than your baby until your healthcare provider tells you it’s OK.  Don’t drive until your healthcare provider says it’s OK.  Don’t have sexual intercourse until after you’ve had a follow-up appointment with your healthcare provider and you have decided on a birth control method.  Follow-up  Make a follow-up appointment as directed by our staff.  When to call your healthcare provider  Call your healthcare provider right away if you have any of the following:  Fever of 100.4°F (38°C) or higher  Redness, pain, or drainage at your incision site  Bleeding that requires a new sanitary pad every hour  Severe pain in the abdomen  Pain or urgency with urination  Foul odor from  vaginal discharge  Trouble urinating or emptying your bladder  No bowel movement within 1 week after the birth of your baby  Swollen, red, painful area in the leg  Appearance of rash or hives  Sore, red, painful area on the breasts that may be accompanied by flu-like symptoms  Feelings of anxiety, panic, and/or depression   Date Last Reviewed: 8/16/2015  © 9085-0613 [employer]. [address] [place] [address]. All rights reserved. This information is not intended as a substitute for professional medical care. Always follow your healthcare professional's instructions.

## 2025-07-01 NOTE — TELEPHONE ENCOUNTER
Noted and RN will follow up with patient to schedule postpartum appointment. Spoke with surgery scheduler who will inform assisting surgeon.

## 2025-07-01 NOTE — DISCHARGE SUMMARY
[facility]  part of [facility]    Discharge Summary    Heike Ha Patient Status:  Inpatient    2000 MRN [id number]   Location [facility] 3SE Attending Elvis Mak,    Hosp Day # 0       Admit date:  2025    Discharge date: 7/3/2025    Delivering OB Clinician: Aubree    EDC: Estimated Date of Delivery: 25    Gestational Age: 37w4d    Antepartum complications:   Patient Active Problem List   Diagnosis    Transfer of care    History of     Antepartum anemia (HCC)    Iron deficiency anemia     contractions (HCC)    Genital herpes affecting pregnancy (HCC)    Threatened premature labor in third trimester (HCC)    Previous  delivery affecting pregnancy (HCC)    Onset (spontaneous) of labor after 37 completed weeks of gestation but before 39 completed weeks gestation, with delivery by (planned)  section (HCC)       Date of Delivery: 2025 Time of Delivery: 4:19 PM    Delivery Type: repeat  section, low transverse incision with bilateral salpingectomy    Baby: Liveborn female   Information for the patient's :  Leland Girl [[id number]]   6 lb 10.5 oz (3.02 kg)  Apgars:  1 minute: 8  5 minutes: 910 minutes:       Intrapartum Complications: Scheduled RLTCS with bs but presented in labor. Proceeded with  after 6 hours NPO.       Hospital Course: patient had postpartum hemorrhage with QBL 1200.  Patient started on iron po due to allergy to venofer.  Patient also had LLE pain on POD#2 with negative dopplers. Discharged home on POD#3 in stable condition    Discharge Physical Exam:   /82 (BP Location: Right arm)   Pulse 52   Temp 97.7 °F (36.5 °C) (Axillary)   Resp 15   Wt 140 lb 14 oz (63.9 kg)   LMP 2024 (Exact Date)   SpO2 99%   Breastfeeding No   BMI 28.45 kg/m²   General appearance:  alert, appears stated age, cooperative and no distress  Abdominal: soft, non-tender, no  rebound  Uterus: firm, nontender, below umbilicus  Incision: clean, dry and intact  Pelvic: deferred  Extremities: Homans sign is negative, no sign of DVT    Discharged Condition: stable    Disposition: home with self care    Plan:     Follow-up appointment in 6 weeks with Dr. Mak

## 2025-07-01 NOTE — PAYOR COMM NOTE
--------------  ADMISSION REVIEW     Payor: [facility]  Subscriber #:  [id number]  Authorization Number: [id number]    Admit date: 25  Admit time: 12:24 PM       REVIEW DOCUMENTATION:  OPERATION DATE:  2025     OPERATIVE REPORT        PREOPERATIVE DIAGNOSIS:  Pregnancy at 37 weeks with latent phase labor, previous  section, and desires sterilization.  POSTOPERATIVE DIAGNOSIS:  Pregnancy at 37 weeks with latent phase labor, previous  section, and desires sterilization, delivery of a viable female infant.  PROCEDURE:  Repeat low transverse  section with bilateral salpingectomy.        FINDINGS:  There was normal uterus, tubes, and ovaries noted.  There was a live female infant delivered weighing 6 pounds 11 ounces with Apgar scores of 8 and 9 at one and five minutes, respectively.  Baby was in right occiput posterior position.  There was no nuchal cord.  Amniotic fluid was clear.     MEDICATIONS ADMINISTERED IN LAST 1 DAY:  acetaminophen (Tylenol Extra Strength) tab 1,000 mg       Date Action Dose Route User    2025 0813 Given 1,000 mg Oral Mendy Earl RN          azithromycin (Zithromax) 500 mg in sodium chloride 0.9% 250mL IVPB premix       Date Action Dose Route User    2025 1558 Given 500 mg Intravenous Lynda Ro CRNA          bupivacaine in dextrose (Marcaine) 0.75-8.25 % intrathecal/spinal injection       Date Action Dose Route User    2025 1551 Given 1.5 mL Intrathecal Lynda Ro CRNA          ceFAZolin (Ancef) 2g in 10mL IV syringe premix       Date Action Dose Route User    2025 1555 Given 2 g Intravenous Lynda Ro CRNA          dexamethasone (Decadron) 4 MG/ML injection       Date Action Dose Route User    2025 1627 Given 4 mg Intravenous Lynda Ro CRNA          dextrose in lactated ringers 5% infusion       Date Action Dose Route User    2025  0339 New Bag (none) Intravenous Apolonia Thayer RN          famotidine (Pepcid) 20 mg/2mL injection 20 mg       Date Action Dose Route User    6/30/2025 1503 Given 20 mg Intravenous Francisca Jeffers RN          ketorolac (Toradol) 30 MG/ML injection 30 mg       Date Action Dose Route User    6/30/2025 1724 Given 30 mg Intravenous Francisca Jeffers RN          ketorolac (Toradol) 30 MG/ML injection 30 mg       Date Action Dose Route User    7/1/2025 0555 Given 30 mg Intravenous Apolonia Thayer RN    6/30/2025 2329 Given 30 mg Intravenous Apolonia Thayer RN          lactated ringers infusion       Date Action Dose Route User    6/30/2025 2030 Rate/Dose Verify (none) Intravenous Apolonia Thayer RN    6/30/2025 1917 New Bag (none) Intravenous Francisca Jeffers RN          lactated ringers infusion       Date Action Dose Route User    6/30/2025 1608 New Bag (none) Intravenous Lynda Ro CRNA    6/30/2025 1544 New Bag (none) Intravenous Lynda Ro CRNA          lidocaine PF (Xylocaine-MPF) 1% injection       Date Action Dose Route User    6/30/2025 1551 Given 5 mL Injection Lynda Ro CRNA          metoclopramide (Reglan) 5 mg/mL injection 10 mg       Date Action Dose Route User    6/30/2025 1504 Given 10 mg Intravenous Francisca Jeffers RN          morphINE (PF) 1 MG/ML injection       Date Action Dose Route User    6/30/2025 1551 Given 0.25 mg Intrathecal Lynda Ro CRNA          nalbuphine (Nubain) 10 mg/mL injection 2.5 mg       Date Action Dose Route User    6/30/2025 1809 Given 2.5 mg Intravenous Francisca Jeffers RN          nalbuphine (Nubain) 10 mg/mL injection 2.5 mg       Date Action Dose Route User    7/1/2025 0813 Given 2.5 mg Intravenous Mendy Earl RN    7/1/2025 0339 Given 2.5 mg Intravenous Apolonia Thayer RN    6/30/2025 5119 Given 2.5 mg Intravenous GittingApolonia ordoñez RN          ondansetron (Zofran) 4 MG/2ML injection 4 mg        Date Action Dose Route User    6/30/2025 1923 Given 4 mg Intravenous Linda Aiken RN          ondansetron (Zofran) 4 MG/2ML injection       Date Action Dose Route User    6/30/2025 1555 Given 4 mg Intravenous Lynda Ro CRNA          oxyTOCIN in sodium chloride 0.9% (Pitocin) 30 Units/500mL infusion premix       Date Action Dose Route User    6/30/2025 1720 Rate/Dose Change 62.5 roxy-units/min Intravenous Francisca Jeffers RN    6/30/2025 1620 New Bag 300 mL/hr Intravenous Lynda Ro CRNA          oxyTOCIN in sodium chloride 0.9% (Pitocin) 30 Units/500mL infusion premix       Date Action Dose Route User    6/30/2025 2005 New Bag 62.5 roxy-units/min Intravenous Linda Aiken RN          phenylephrine (Remigio-Synephrine) 10 MG/ML injection       Date Action Dose Route User    6/30/2025 1639 Given 100 mcg Intravenous Lynda Ro, CRNA    6/30/2025 1621 Given 100 mcg Intravenous Lynda Ro, CRNA    6/30/2025 1609 Given 100 mcg Intravenous Lynda Ro, CRNA    6/30/2025 1555 Given 100 mcg Intravenous Lynda Ro CRNA          scopolamine (Transderm-Scop) 1 MG/3DAYS patch 1 patch       Date Action Dose Route User    6/30/2025 1945 Patch Applied 1 patch Transdermal (Behind Right Ear) Linda Aiken RN          sodium citrate-citric acid (Bicitra) 500-334 MG/5ML oral solution 30 mL       Date Action Dose Route User    6/30/2025 1504 Given 30 mL Oral Francisca Jeffers RN          tranexamic acid in sodium chloride 0.7% (Cyklokapron) 1000 mg/100mL infusion premix       Date Action Dose Route User    6/30/2025 1917 New Bag 1,000 mg (none) Francisca Jeffers RN            Vitals (last day)       Date/Time Temp Pulse Resp BP SpO2 Weight O2 Device O2 Flow Rate (L/min) [facility]    07/01/25 1000 97.7 °F (36.5 °C) 56 16 105/69 -- -- None (Room air) -- NW 07/01/25 0813 97.8 °F (36.6 °C) 57 16 108/83 98 % -- None (Room air) -- NW 07/01/25  0555 97.8 °F (36.6 °C) 49 15 95/56 -- -- None (Room air) -- NG    07/01/25 0400 -- 54 -- -- 100 % -- -- -- NG    07/01/25 0300 -- 58 -- -- 100 % -- -- -- NG    07/01/25 0230 98 °F (36.7 °C) 60 16 110/87 96 % -- None (Room air) -- NG    07/01/25 0200 -- 62 -- -- 99 % -- -- -- NG    07/01/25 0100 -- 56 -- -- 100 % -- -- -- NG    07/01/25 0000 -- 78 -- -- 97 % -- -- -- NG    06/30/25 2300 -- 54 -- -- 99 % -- -- -- NG    06/30/25 2245 97.7 °F (36.5 °C) 52 15 112/82 99 % -- None (Room air) -- NG    06/30/25 2200 -- 58 -- -- 97 % -- -- -- NG    06/30/25 2100 -- 61 -- -- 96 % -- -- --     06/30/25 2030 97.4 °F (36.3 °C) 55 16 108/74 -- 140 lb 14 oz (63.9 kg) None (Room air) --     06/30/25 2005 -- -- 14 97/54 97 % -- -- --     06/30/25 2000 -- 66 15 115/87 97 % -- -- --     06/30/25 1950 -- -- 17 -- 100 % -- -- --     06/30/25 1940 -- -- 28 107/52 99 % -- -- --     06/30/25 1930 -- 54 16 112/50 100 % -- -- --     06/30/25 1920 -- 96 20 144/94 100 % -- -- --     06/30/25 1915 -- 65 15 102/66 100 % -- -- --     06/30/25 1910 -- 65 11 102/66 97 % -- -- --     06/30/25 1900 -- 60 18 106/61 98 % -- -- -- AP    06/30/25 1850 -- 49 20 102/57 99 % -- -- -- AP    06/30/25 1840 -- 59 15 123/47 97 % -- -- -- AP    06/30/25 1830 -- 54 15 99/65 96 % -- -- -- AP    06/30/25 1820 -- 59 17 98/63 98 % -- -- -- AP    06/30/25 1810 -- 58 17 103/60 100 % -- -- -- AP    06/30/25 1809 -- 58 16 103/60 99 % -- -- -- AP    06/30/25 1800 -- 55 18 105/63 97 % -- -- -- AP    06/30/25 1750 -- 56 18 97/62 98 % -- -- -- AP    06/30/25 1740 -- 60 20 88/46 97 % -- -- -- AP    06/30/25 1730 -- 63 16 99/65 100 % -- -- -- AP    06/30/25 1724 -- 57 21 99/57 99 % -- -- -- AP 06/30/25 1720 -- 57 17 99/57 99 % -- -- -- AP 06/30/25 1711 97 °F (36.1 °C) -- -- -- -- -- -- -- AP    06/30/25 1711 -- 88 16 -- 98 % -- -- --     06/30/25 1710 -- 53 18 98/48 100 % -- -- -- AP    06/30/25 1115 97.7 °F (36.5 °C) 80 16 107/64 -- -- -- -- JR           CIWA Scores (since admission)       None

## 2025-07-01 NOTE — PLAN OF CARE
Problem: BIRTH - VAGINAL/ SECTION  Goal: Fetal and maternal status remain reassuring during the birth process  Description: INTERVENTIONS:  - Monitor vital signs  - Monitor fetal heart rate  - Monitor uterine activity  - Monitor labor progression (vaginal delivery)  - DVT prophylaxis (C/S delivery)  - Surgical antibiotic prophylaxis (C/S delivery)  Outcome: Completed     Problem: PAIN - ADULT  Goal: Verbalizes/displays adequate comfort level or patient's stated pain goal  Description: INTERVENTIONS:  - Encourage pt to monitor pain and request assistance  - Assess pain using appropriate pain scale  - Administer analgesics based on type and severity of pain and evaluate response  - Implement non-pharmacological measures as appropriate and evaluate response  - Consider cultural and social influences on pain and pain management  - Manage/alleviate anxiety  - Utilize distraction and/or relaxation techniques  - Monitor for opioid side effects  - Notify MD/LIP if interventions unsuccessful or patient reports new pain  - Anticipate increased pain with activity and pre-medicate as appropriate  Outcome: Progressing     Problem: ANXIETY  Goal: Will report anxiety at manageable levels  Description: INTERVENTIONS:  - Administer medication as ordered  - Teach and rehearse alternative coping skills  - Provide emotional support with 1:1 interaction with staff  Outcome: Progressing     Problem: GASTROINTESTINAL - ADULT  Goal: Minimal or absence of nausea and vomiting  Description: INTERVENTIONS:  - Maintain adequate hydration with IV or PO as ordered and tolerated  - Nasogastric tube to low intermittent suction as ordered  - Evaluate effectiveness of ordered antiemetic medications  - Provide nonpharmacologic comfort measures as appropriate  - Advance diet as tolerated, if ordered  - Obtain nutritional consult as needed  - Evaluate fluid balance  Outcome: Progressing  Goal: Maintains or returns to baseline bowel  -not volume overloaded, no severe acidosis, no severe electrolyte abnormalities, no signs of uremia  -dialysis is being postponed until tomorrow    Emmanuel Hare     function  Description: INTERVENTIONS:  - Assess bowel function  - Maintain adequate hydration with IV or PO as ordered and tolerated  - Evaluate effectiveness of GI medications  - Encourage mobilization and activity  - Obtain nutritional consult as needed  - Establish a toileting routine/schedule  - Consider collaborating with pharmacy to review patient's medication profile  Outcome: Progressing  Goal: Maintains adequate nutritional intake (undernourished)  Description: INTERVENTIONS:  - Monitor percentage of each meal consumed  - Identify factors contributing to decreased intake, treat as appropriate  - Assist with meals as needed  - Monitor I&O, WT and lab values  - Obtain nutritional consult as needed  - Optimize oral hygiene and moisture  - Encourage food from home; allow for food preferences  - Enhance eating environment  Outcome: Progressing  Goal: Achieves appropriate nutritional intake (bariatric)  Description: INTERVENTIONS:  - Monitor for over-consumption  - Identify factors contributing to increased intake, treat as appropriate  - Monitor I&O, WT and lab values  - Obtain nutritional consult as needed  - Evaluate psychosocial factors contributing to over-consumption  Outcome: Progressing

## 2025-07-01 NOTE — TELEPHONE ENCOUNTER
Patient was scheduled  07-10. She presented in labor and procedure done today. Please let surgeon and assist know. Patient to see me at 6 weeks. Thanks.

## 2025-07-02 NOTE — PLAN OF CARE
Problem: GASTROINTESTINAL - ADULT  Goal: Minimal or absence of nausea and vomiting  Description: INTERVENTIONS:  - Maintain adequate hydration with IV or PO as ordered and tolerated  - Nasogastric tube to low intermittent suction as ordered  - Evaluate effectiveness of ordered antiemetic medications  - Provide nonpharmacologic comfort measures as appropriate  - Advance diet as tolerated, if ordered  - Obtain nutritional consult as needed  - Evaluate fluid balance  Outcome: Progressing  Goal: Maintains or returns to baseline bowel function  Description: INTERVENTIONS:  - Assess bowel function  - Maintain adequate hydration with IV or PO as ordered and tolerated  - Evaluate effectiveness of GI medications  - Encourage mobilization and activity  - Obtain nutritional consult as needed  - Establish a toileting routine/schedule  - Consider collaborating with pharmacy to review patient's medication profile  Outcome: Progressing  Goal: Maintains adequate nutritional intake (undernourished)  Description: INTERVENTIONS:  - Monitor percentage of each meal consumed  - Identify factors contributing to decreased intake, treat as appropriate  - Assist with meals as needed  - Monitor I&O, WT and lab values  - Obtain nutritional consult as needed  - Optimize oral hygiene and moisture  - Encourage food from home; allow for food preferences  - Enhance eating environment  Outcome: Progressing  Goal: Achieves appropriate nutritional intake (bariatric)  Description: INTERVENTIONS:  - Monitor for over-consumption  - Identify factors contributing to increased intake, treat as appropriate  - Monitor I&O, WT and lab values  - Obtain nutritional consult as needed  - Evaluate psychosocial factors contributing to over-consumption  Outcome: Progressing     Problem: POSTPARTUM  Goal: Long Term Goal:Experiences normal postpartum course  Description: INTERVENTIONS:  - Assess and monitor vital signs and lab values.  - Assess fundus and lochia.  -  Provide ice/sitz baths for perineum discomfort.  - Monitor healing of incision/episiotomy/laceration, and assess for signs and symptoms of infection and hematoma.  - Assess bladder function and monitor for bladder distention.  - Provide/instruct/assist with pericare as needed.  - Provide VTE prophylaxis as needed.  - Monitor bowel function.  - Encourage ambulation and provide assistance as needed.  - Assess and monitor emotional status and provide social service/psych resources as needed.  - Utilize standard precautions and use personal protective equipment as indicated. Ensure aseptic care of all intravenous lines and invasive tubes/drains.  - Obtain immunization and exposure to communicable diseases history.  Outcome: Progressing  Goal: Optimize infant feeding at the breast  Description: INTERVENTIONS:  - Initiate breast feeding within first hour after birth.   - Monitor effectiveness of current breast feeding efforts.  - Assess support systems available to mother/family.  - Identify cultural beliefs/practices regarding lactation, letdown techniques, maternal food preferences.  - Assess mother's knowledge and previous experience with breast feeding.  - Provide information as needed about early infant feeding cues (e.g., rooting, lip smacking, sucking fingers/hand) versus late cue of crying.  - Discuss/demonstrate breast feeding aids (e.g., infant sling, nursing footstool/pillows, and breast pumps).  - Encourage mother/other family members to express feelings/concerns, and actively listen.  - Educate father/SO about benefits of breast feeding and how to manage common lactation challenges.  - Recommend avoidance of specific medications or substances incompatible with breast feeding.  - Assess and monitor for signs of nipple pain/trauma.  - Instruct and provide assistance with proper latch.  - Review techniques for milk expression (breast pumping) and storage of breast milk. Provide pumping equipment/supplies,  instructions and assistance, as needed.  - Encourage rooming-in and breast feeding on demand.  - Encourage skin-to-skin contact.  - Provide LC support as needed.  - Assess for and manage engorgement.  - Provide breast feeding education handouts and information on community breast feeding support.   Outcome: Progressing  Goal: Establishment of adequate milk supply with medication/procedure interruptions  Description: INTERVENTIONS:  - Review techniques for milk expression (breast pumping).   - Provide pumping equipment/supplies, instructions, and assistance until it is safe to breastfeed infant.  Outcome: Progressing  Goal: Appropriate maternal -  bonding  Description: INTERVENTIONS:  - Assess caregiver- interactions.  - Assess caregiver's emotional status and coping mechanisms.  - Encourage caregiver to participate in  daily care.  - Assess support systems available to mother/family.  - Provide /case management support as needed.  Outcome: Progressing     Problem: GENITOURINARY - ADULT  Goal: Absence of urinary retention  Description: INTERVENTIONS:  - Assess patient’s ability to void and empty bladder  - Monitor intake/output and perform bladder scan as needed  - Follow urinary retention protocol/standard of care  - Consider collaborating with pharmacy to review patient's medication profile  - Implement strategies to promote bladder emptying  Outcome: Progressing

## 2025-07-02 NOTE — PROGRESS NOTES
[facility] [place]  [facility]    OB/Gyne Post  Section Progress Note      Heike Ha Patient Status:  Inpatient    2000 MRN [id number]   Location [facility] 3SE Attending Elvis Mak, DO   Hosp Day # 2 PCP None Pcp       Subjective     Okay pain control--wants narcotic. Tolerating present diet. Ambulating. Voiding freely. Lochia light.  Flatus: present.    She denies fevers, chills, headache, blurry vision, chest pain, SOB, RUQ pain, n/v, dizziness upon ambulation nor leg pain.     Objective   Vital signs in last 24 hours:  Temp:  [97.9 °F (36.6 °C)-98.5 °F (36.9 °C)] 98.5 °F (36.9 °C)  Pulse:  [63-64] 63  Resp:  [16] 16  BP: ()/(61-76) 107/76    Input/Output:    Intake/Output Summary (Last 24 hours) at 2025 1442  Last data filed at 2025 1700  Gross per 24 hour   Intake --   Output 300 ml   Net -300 ml       Constitutional: comfortable  Abdomen: soft nontender  Uterus: fundus below umbilicus, non tender  Wound/Incision:  Clean / dry / intact  Extremities: No calf tenderness, SCDs on while in bed, neg homans      Results:   Labs / Path / Radiology:    No results found for this or any previous visit (from the past 24 hours).      US VENOUS DOPPLER LEG LEFT - DIAG IMG (CPT=93971)  Result Date: 2025  CONCLUSION: No sonographic evidence of left lower extremity DVT. Electronically Verified and Signed by Attending Radiologist: Pedro Sarmiento MD 2025 12:29 PM Workstation: [employer]        Assessment/Plan   POD# 2 with following issues:   Patient Active Problem List   Diagnosis    Transfer of care    History of     Antepartum anemia (HCC)    Iron deficiency anemia     contractions (HCC)    Genital herpes affecting pregnancy (HCC)    Threatened premature labor in third trimester (HCC)    Previous  delivery affecting pregnancy (HCC)    Onset (spontaneous) of labor after 37 completed weeks of gestation but before 39  completed weeks gestation, with delivery by (planned)  section (HCC)   .    CPM, ambulate  Add oxy for pain; encouraged lane Helton DO  2025  2:42 PM

## 2025-07-02 NOTE — CM/SW NOTE
SW self referral for Medicaid and SCN.   SW met with patient and FOB bedside.  SW confirmed face sheet contact as correct.    Baby girl name: Josselin  Date & time of delivery: 25 11:11 PM  Delivery method:  section  Siblings age: 10, 8, 2, y.o. and 11 months    Patient employed: [employer]  Length of maternity leave: NA    Father of baby employed: Yes  Length of paternity leave: 1 week    Breast or formula feed: Formula    Pediatrician: Not chosen yet     Infant Insurance: [place] Medicaid   Change HC contacted: NA    Mental Health History:  Denies    Medications: Denies    Therapist: Barb    Psychiatrist: Barb    SW discussed signs, symptoms and risks associated with post partum depression & anxiety.  SW provided pt with PMAD resources.  Other resources provided: Mom line, Mood boost, SDOH, WIC, Depression and Anxiety, PMAD/Support group, Stress management, and SCN packet     Patient support system: FOB, Mom, ANGÉLICA    Patient denied current questions/needs from SW.    SW to remain available for support and/or discharge planning.    Brooklyn Preciado MSW, LSW   [id number]

## 2025-07-03 NOTE — PLAN OF CARE
Problem: GASTROINTESTINAL - ADULT  Goal: Minimal or absence of nausea and vomiting  Description: INTERVENTIONS:  - Maintain adequate hydration with IV or PO as ordered and tolerated  - Nasogastric tube to low intermittent suction as ordered  - Evaluate effectiveness of ordered antiemetic medications  - Provide nonpharmacologic comfort measures as appropriate  - Advance diet as tolerated, if ordered  - Obtain nutritional consult as needed  - Evaluate fluid balance  Outcome: Completed  Goal: Maintains or returns to baseline bowel function  Description: INTERVENTIONS:  - Assess bowel function  - Maintain adequate hydration with IV or PO as ordered and tolerated  - Evaluate effectiveness of GI medications  - Encourage mobilization and activity  - Obtain nutritional consult as needed  - Establish a toileting routine/schedule  - Consider collaborating with pharmacy to review patient's medication profile  Outcome: Completed  Goal: Maintains adequate nutritional intake (undernourished)  Description: INTERVENTIONS:  - Monitor percentage of each meal consumed  - Identify factors contributing to decreased intake, treat as appropriate  - Assist with meals as needed  - Monitor I&O, WT and lab values  - Obtain nutritional consult as needed  - Optimize oral hygiene and moisture  - Encourage food from home; allow for food preferences  - Enhance eating environment  Outcome: Completed  Goal: Achieves appropriate nutritional intake (bariatric)  Description: INTERVENTIONS:  - Monitor for over-consumption  - Identify factors contributing to increased intake, treat as appropriate  - Monitor I&O, WT and lab values  - Obtain nutritional consult as needed  - Evaluate psychosocial factors contributing to over-consumption  Outcome: Completed     Problem: POSTPARTUM  Goal: Long Term Goal:Experiences normal postpartum course  Description: INTERVENTIONS:  - Assess and monitor vital signs and lab values.  - Assess fundus and lochia.  - Provide  ice/sitz baths for perineum discomfort.  - Monitor healing of incision/episiotomy/laceration, and assess for signs and symptoms of infection and hematoma.  - Assess bladder function and monitor for bladder distention.  - Provide/instruct/assist with pericare as needed.  - Provide VTE prophylaxis as needed.  - Monitor bowel function.  - Encourage ambulation and provide assistance as needed.  - Assess and monitor emotional status and provide social service/psych resources as needed.  - Utilize standard precautions and use personal protective equipment as indicated. Ensure aseptic care of all intravenous lines and invasive tubes/drains.  - Obtain immunization and exposure to communicable diseases history.  Outcome: Completed  Goal: Optimize infant feeding at the breast  Description: INTERVENTIONS:  - Initiate breast feeding within first hour after birth.   - Monitor effectiveness of current breast feeding efforts.  - Assess support systems available to mother/family.  - Identify cultural beliefs/practices regarding lactation, letdown techniques, maternal food preferences.  - Assess mother's knowledge and previous experience with breast feeding.  - Provide information as needed about early infant feeding cues (e.g., rooting, lip smacking, sucking fingers/hand) versus late cue of crying.  - Discuss/demonstrate breast feeding aids (e.g., infant sling, nursing footstool/pillows, and breast pumps).  - Encourage mother/other family members to express feelings/concerns, and actively listen.  - Educate father/SO about benefits of breast feeding and how to manage common lactation challenges.  - Recommend avoidance of specific medications or substances incompatible with breast feeding.  - Assess and monitor for signs of nipple pain/trauma.  - Instruct and provide assistance with proper latch.  - Review techniques for milk expression (breast pumping) and storage of breast milk. Provide pumping equipment/supplies, instructions and  assistance, as needed.  - Encourage rooming-in and breast feeding on demand.  - Encourage skin-to-skin contact.  - Provide LC support as needed.  - Assess for and manage engorgement.  - Provide breast feeding education handouts and information on community breast feeding support.   Outcome: Completed  Goal: Establishment of adequate milk supply with medication/procedure interruptions  Description: INTERVENTIONS:  - Review techniques for milk expression (breast pumping).   - Provide pumping equipment/supplies, instructions, and assistance until it is safe to breastfeed infant.  Outcome: Completed  Goal: Experiences normal breast weaning course  Description: INTERVENTIONS:  - Assess for and manage engorgement.  - Instruct on breast care.  - Provide comfort measures.  Outcome: Completed  Goal: Appropriate maternal -  bonding  Description: INTERVENTIONS:  - Assess caregiver- interactions.  - Assess caregiver's emotional status and coping mechanisms.  - Encourage caregiver to participate in  daily care.  - Assess support systems available to mother/family.  - Provide /case management support as needed.  Outcome: Completed     Problem: GENITOURINARY - ADULT  Goal: Absence of urinary retention  Description: INTERVENTIONS:  - Assess patient’s ability to void and empty bladder  - Monitor intake/output and perform bladder scan as needed  - Follow urinary retention protocol/standard of care  - Consider collaborating with pharmacy to review patient's medication profile  - Implement strategies to promote bladder emptying  Outcome: Completed

## 2025-07-03 NOTE — PLAN OF CARE
Problem: GASTROINTESTINAL - ADULT  Goal: Minimal or absence of nausea and vomiting  Description: INTERVENTIONS:  - Maintain adequate hydration with IV or PO as ordered and tolerated  - Nasogastric tube to low intermittent suction as ordered  - Evaluate effectiveness of ordered antiemetic medications  - Provide nonpharmacologic comfort measures as appropriate  - Advance diet as tolerated, if ordered  - Obtain nutritional consult as needed  - Evaluate fluid balance  Outcome: Progressing  Goal: Maintains or returns to baseline bowel function  Description: INTERVENTIONS:  - Assess bowel function  - Maintain adequate hydration with IV or PO as ordered and tolerated  - Evaluate effectiveness of GI medications  - Encourage mobilization and activity  - Obtain nutritional consult as needed  - Establish a toileting routine/schedule  - Consider collaborating with pharmacy to review patient's medication profile  Outcome: Progressing     Problem: POSTPARTUM  Goal: Long Term Goal:Experiences normal postpartum course  Description: INTERVENTIONS:  - Assess and monitor vital signs and lab values.  - Assess fundus and lochia.  - Provide ice/sitz baths for perineum discomfort.  - Monitor healing of incision/episiotomy/laceration, and assess for signs and symptoms of infection and hematoma.  - Assess bladder function and monitor for bladder distention.  - Provide/instruct/assist with pericare as needed.  - Provide VTE prophylaxis as needed.  - Monitor bowel function.  - Encourage ambulation and provide assistance as needed.  - Assess and monitor emotional status and provide social service/psych resources as needed.  - Utilize standard precautions and use personal protective equipment as indicated. Ensure aseptic care of all intravenous lines and invasive tubes/drains.  - Obtain immunization and exposure to communicable diseases history.  Outcome: Progressing  Goal: Optimize infant feeding at the breast  Description: INTERVENTIONS:  -  Initiate breast feeding within first hour after birth.   - Monitor effectiveness of current breast feeding efforts.  - Assess support systems available to mother/family.  - Identify cultural beliefs/practices regarding lactation, letdown techniques, maternal food preferences.  - Assess mother's knowledge and previous experience with breast feeding.  - Provide information as needed about early infant feeding cues (e.g., rooting, lip smacking, sucking fingers/hand) versus late cue of crying.  - Discuss/demonstrate breast feeding aids (e.g., infant sling, nursing footstool/pillows, and breast pumps).  - Encourage mother/other family members to express feelings/concerns, and actively listen.  - Educate father/SO about benefits of breast feeding and how to manage common lactation challenges.  - Recommend avoidance of specific medications or substances incompatible with breast feeding.  - Assess and monitor for signs of nipple pain/trauma.  - Instruct and provide assistance with proper latch.  - Review techniques for milk expression (breast pumping) and storage of breast milk. Provide pumping equipment/supplies, instructions and assistance, as needed.  - Encourage rooming-in and breast feeding on demand.  - Encourage skin-to-skin contact.  - Provide LC support as needed.  - Assess for and manage engorgement.  - Provide breast feeding education handouts and information on community breast feeding support.   Outcome: Progressing  Goal: Establishment of adequate milk supply with medication/procedure interruptions  Description: INTERVENTIONS:  - Review techniques for milk expression (breast pumping).   - Provide pumping equipment/supplies, instructions, and assistance until it is safe to breastfeed infant.  Outcome: Progressing  Goal: Experiences normal breast weaning course  Description: INTERVENTIONS:  - Assess for and manage engorgement.  - Instruct on breast care.  - Provide comfort measures.  Outcome: Progressing  Goal:  Appropriate maternal -  bonding  Description: INTERVENTIONS:  - Assess caregiver- interactions.  - Assess caregiver's emotional status and coping mechanisms.  - Encourage caregiver to participate in  daily care.  - Assess support systems available to mother/family.  - Provide /case management support as needed.  Outcome: Progressing     Problem: GENITOURINARY - ADULT  Goal: Absence of urinary retention  Description: INTERVENTIONS:  - Assess patient’s ability to void and empty bladder  - Monitor intake/output and perform bladder scan as needed  - Follow urinary retention protocol/standard of care  - Consider collaborating with pharmacy to review patient's medication profile  - Implement strategies to promote bladder emptying  Outcome: Progressing      86

## 2025-07-03 NOTE — PROGRESS NOTES
FOCUS: MOM DISCHARGE     D: DISCHARGE ORDERS RECEIVED FROM PROVIDER.     A: POSTPARTUM DISCHARGE AVS'S GIVEN AND REVIEWED EXTENSIVELY, PRESCRIPTIONS REVIEWED. VOCALIZED UNDERSTANDING. PATIENT INFORMED WHEN TO MAKE FOLLOW UP APPOINTMENTS WITH PROVIDER.     R: PARENT(S) INTERACTING APPROPRIATELY WITH INFANT IN SCN. VERBALIZED UNDERSTANDING OF FOLLOW UP INSTRUCTIONS. DISCHARGED IN STABLE CONDITION VIA AMBULATORY TO SCN 3.

## (undated) NOTE — LETTER
August 17, 2017    Patient: Rudy Rodriguez   Date of Visit: 8/17/2017       To Whom It May Concern:    Rudy Rodriguez was seen and treated in our emergency department on 8/17/2017. She may return to work 8/18/2017.     If you have any questions or concerns, ple

## (undated) NOTE — ED AVS SNAPSHOT
Pauline Smith   MRN: [id number]    Department:  [facility] Emergency Department   Date of Visit:  6/18/2018           Disclosure     Insurance plans vary and the physician(s) referred by the ER may not be covered by your plan.  Please contact your CARE PHYSICIAN AT ONCE OR RETURN IMMEDIATELY TO THE EMERGENCY DEPARTMENT. If you have been prescribed any medication(s), please fill your prescription right away and begin taking the medication(s) as directed.   If you believe that any of the medications

## (undated) NOTE — LETTER
[facility]  Administration of Anesthesia  MILDREDHeike agree to be cared for by a physician anesthesiologist alone and/or with a nurse anesthetist, who is specially trained to monitor me and give me medicine to put me to sleep or keep me comfortable during my procedure    I understand that my anesthesiologist and/or anesthetist is not an employee or agent of [facility] or [employer]. He or she works for [facility] [employer]    As the patient asking for anesthesia services, I agree to:  Allow the anesthesiologist (anesthesia doctor) to give me medicine and do additional procedures as necessary. Some examples are: Starting or using an “IV” to give me medicine, fluids or blood during my procedure, and having a breathing tube placed to help me breathe when I’m asleep (intubation). In the event that my heart stops working properly, I understand that my anesthesiologist will make every effort to sustain my life, unless otherwise directed by [facility] Do Not Resuscitate documents.  Tell my anesthesia doctor before my procedure:  If I am pregnant.  The last time that I ate or drank.  iii. All of the medicines I take (including prescriptions, herbal supplements, and pills I can buy without a prescription (including street drugs/illegal medications). Failure to inform my anesthesiologist about these medicines may increase my risk of anesthetic complications.  iv.If I am allergic to anything or have had a reaction to anesthesia before.  I understand how the anesthesia medicine will help me (benefits).  I understand that with any type of anesthesia medicine there are risks:  The most common risks are: nausea, vomiting, sore throat, muscle soreness, damage to my eyes, mouth, or teeth (from breathing tube placement).  Rare risks include: remembering what happened during my procedure, allergic reactions to medications, injury to my airway, heart, lungs, vision, nerves, or  muscles and in extremely rare instances death.  My doctor has explained to me other choices available to me for my care (alternatives).  Pregnant Patients (“epidural”):  I understand that the risks of having an epidural (medicine given into my back to help control pain during labor), include itching, low blood pressure, difficulty urinating, headache or slowing of the baby’s heart. Very rare risks include infection, bleeding, seizure, irregular heart rhythms and nerve injury.  Regional Anesthesia (“spinal”, “epidural”, & “nerve blocks”):  I understand that rare but potential complications include headache, bleeding, infection, seizure, irregular heart rhythms, and nerve injury.    _____________________________________________________________________________  Patient (or Representative) Signature/Relationship to Patient  Date   Time    _____________________________________________________________________________   Name (if used)    Language/Organization   Time    _____________________________________________________________________________  Nurse Anesthetist Signature     Date   Time  _____________________________________________________________________________  Anesthesiologist Signature     Date   Time  I have discussed the procedure and information above with the patient (or patient’s representative) and answered their questions. The patient or their representative has agreed to have anesthesia services.    _____________________________________________________________________________  Witness        Date   Time  I have verified that the signature is that of the patient or patient’s representative, and that it was signed before the procedure  Patient Name: Heike Ha     : 2000                 Printed: 2025 at 12:25 PM    Medical Record #: [id number]                                            Page 1 of 1  ----------ANESTHESIA CONSENT----------

## (undated) NOTE — LETTER
Date & Time: 6/18/2018, 4:29 PM  Patient: Rylee Park  Encounter Provider(s):    Kavita Felix MD       To Whom It May Concern:    Rylee Park was seen and treated in our department on 6/18/2018.  She can return to work on 6/19  If you have any questio

## (undated) NOTE — ED AVS SNAPSHOT
Beth Perkins   MRN: [id number]    Department:  [facility] Emergency Department   Date of Visit:  8/22/2017           Disclosure     Insurance plans vary and the physician(s) referred by the ER may not be covered by your plan.  Please contact your CARE PHYSICIAN AT ONCE OR RETURN IMMEDIATELY TO THE EMERGENCY DEPARTMENT. If you have been prescribed any medication(s), please fill your prescription right away and begin taking the medication(s) as directed.   If you believe that any of the medications

## (undated) NOTE — LETTER
Date & Time: 5/29/2019, 3:00 PM  Patient: Tate Speak  Encounter Provider(s):    NI Pedro       To Whom It May Concern:    Angi Lan was seen and treated in our department on 5/29/2019.  She should not return to work until 6/1/19, Delfina Ambriz

## (undated) NOTE — ED AVS SNAPSHOT
Sekou Leonard   MRN: [id number]    Department:  [facility] Emergency Department   Date of Visit:  8/17/2017           Disclosure     Insurance plans vary and the physician(s) referred by the ER may not be covered by your plan.  Please contact your CARE PHYSICIAN AT ONCE OR RETURN IMMEDIATELY TO THE EMERGENCY DEPARTMENT. If you have been prescribed any medication(s), please fill your prescription right away and begin taking the medication(s) as directed.   If you believe that any of the medications

## (undated) NOTE — ED AVS SNAPSHOT
Skye Pedraza   MRN: [id number]    Department:  [facility] Emergency Department   Date of Visit:  6/19/2018           Disclosure     Insurance plans vary and the physician(s) referred by the ER may not be covered by your plan.  Please contact your CARE PHYSICIAN AT ONCE OR RETURN IMMEDIATELY TO THE EMERGENCY DEPARTMENT. If you have been prescribed any medication(s), please fill your prescription right away and begin taking the medication(s) as directed.   If you believe that any of the medications

## (undated) NOTE — ED AVS SNAPSHOT
[facility] Emergency Department    [address]     [place] [address]    Phone:  [phone]    Fax:  [phone]           Roberto Rouse   MRN: [id number]    Department:  [facility] Emergency Department   Date of Visit:  1/10/2017 DIZZINESS OR SYNCOPE (FAINTING) DURING PREGNANCY (ENGLISH)      Disclosure     Insurance plans vary and the physician(s) referred by the ER may not be covered by your plan.  Please contact your insurance company to determine coverage and benefits available If you have been prescribed any medication(s), please fill your prescription right away and begin taking the medication(s) as directed.   If you believe that any of the medications or instructions on this list is different from what your Primary Care doctor coverage. Patient [address] is a Federal Navigator program that can help with your Affordable Care Act coverage, as well as all types of Medicaid plans.   To get signed up and covered, please call [phone] and ask to get set up for an insuran

## (undated) NOTE — ED AVS SNAPSHOT
[facility] Emergency Department    [address]     [place] [address]    Phone:  [phone]    Fax:  [phone]           Cynthia Louise   MRN: [id number]    Department:  [facility] Emergency Department   Date of Visit:  2/16/2017 and Class Registration line at [phone] or find a doctor online by visiting [email or web address].    IF THERE IS ANY CHANGE OR WORSENING OF YOUR CONDITION, CALL YOUR PRIMARY CARE PHYSICIAN AT ONCE OR RETURN IMMEDIATELY TO [address] [place] [address].     If

## (undated) NOTE — LETTER
Date & Time: 5/20/2019, 11:35 PM  Patient: Sharif Welch  Encounter Provider(s):    Zafar Angelo MD       To Whom It May Concern:    Thania Maxwell was seen and treated in our department on 5/20/2019. She should not return to work until 05/21/19.     If

## (undated) NOTE — LETTER
Date & Time: 9/5/2018, 10:37 PM  Patient: Roberto Rouse  Encounter Provider(s):    Jo Ann Chong MD       To Whom It May Concern:    Roberto Rouse was seen and treated in our department on 9/5/2018. She may return to work.      If you have any questions or

## (undated) NOTE — LETTER
Date & Time: 6/20/2018, 12:48 AM  Patient: Stacy Arias  Encounter Provider(s):    Tho Arce MD       To Whom It May Concern:    Stacy Arias was seen and treated in our department on 6/19/2018. She can return to work.     If you have any questions o

## (undated) NOTE — ED AVS SNAPSHOT
Mally Arthur   MRN: [id number]    Department:  [facility] Emergency Department   Date of Visit:  3/12/2018           Disclosure     Insurance plans vary and the physician(s) referred by the ER may not be covered by your plan.  Please contact your CARE PHYSICIAN AT ONCE OR RETURN IMMEDIATELY TO THE EMERGENCY DEPARTMENT. If you have been prescribed any medication(s), please fill your prescription right away and begin taking the medication(s) as directed.   If you believe that any of the medications

## (undated) NOTE — LETTER
[facility]  Scheduling the Birth of Your Baby    You are scheduled for a  delivery on Thursday,07/10/25 at 1:30pm with .  You should arrive at the [facility] at 11:30am.      Please follow the enclosed antimicrobial wash instructions.  This wash should be started 2 days prior to surgery and be continued until the day of surgery, for a total of 3 washes.    There is pre-op testing that has to be done within 2 days before your surgery. These labs must be done within the [facility], not an outside lab. All labs must be scheduled in advance throught  [facility] or by going to [email or web address].    A Nurse from Labor and Delivery  will be calling a few days before your scheduled section to go over instructions regarding an enhanced recovery. If you don't receive a call please call them at [phone].    Unless otherwise instructed by your physician, DO NOT eat or drink anything within 6 hours of your arrival to the [facility].    If you have not preregistered, please mail in your preregistration forms.    The [facility] is located on the 3rd floor of [facility].  Please use the east elevators.    When you arrive, you will be brought to your room and asked to change into a hospital gown.  Your nurse will take your temperature, blood pressure, and pulse and place you on the fetal monitor to monitor the baby's well being and any uterine activity you may be experiencing prior to your delivery.  Your nurse will also ask you some questions, start an intravenous (IV) line, and possibly draw some blood if your lab tests were not completed prior to your arrival.  You will also sign a consent for surgery.    Your partner will be asked to change into scrubs so that he/she can be with you in the operating room for the birth of your child.  There are no cameras or video cameras allowed in the operating room.    You will be interviewed by the  anesthesiologist, support stockings will be applied to your lower legs, and you will be shaved at the incision site.    When surgery is completed, you and your partner will be taken to the recovery room for at least an hour prior to your return to your room.    Please feel free to contact the [facility] with any questions or concerns @ [phone].

## (undated) NOTE — LETTER
Date & Time: 10/25/2018, 2:15 PM  Patient: Sarah Cisneros  Encounter Provider(s):    Hilaria Ty MD       To Whom It May Concern:    Sarah Cisneros was seen and treated in our department on 10/25/2018. She can return to work 10/26/2018.     If you have an

## (undated) NOTE — ED AVS SNAPSHOT
Destini Moreno   MRN: [id number]    Department:  [facility] Emergency Department   Date of Visit:  10/25/2018           Disclosure     Insurance plans vary and the physician(s) referred by the ER may not be covered by your plan.  Please contact your CARE PHYSICIAN AT ONCE OR RETURN IMMEDIATELY TO THE EMERGENCY DEPARTMENT. If you have been prescribed any medication(s), please fill your prescription right away and begin taking the medication(s) as directed.   If you believe that any of the medications

## (undated) NOTE — LETTER
INSTRUCTIONS FOR PRE-SURGICAL   ANTIMICROBIAL BATH/SHOWER    Your doctor has recommended a pre-surgical CHG (chlorhexidine gluconate) shower/bath with Betasept (also sold as Hibiclens).  It reduces bacteria that can potentially cause infection.  Betasept is available at [facility] and usually at your local retail pharmacy.    The average adult will use a total of 6 to 10 ounces for three baths.  That is approximately two 4 oz. Bottles.  Depending on individual size, weight and number of treatments, the amount may vary.    IMPORTANT! Read ALL instructions BEFORE starting.     AVOID these areas:  Head: hair, scalp, eyes, ears, nose and mouth  Genital area (inner vaginal and inner rectal)  All open wounds (including surgical incisions)    CONCENTRATE on these areas:  Under arms  Under breast tissue  Between skin folds  Hair bearing areas of groin and between buttocks    DIRECTIONS:  Take your usual shower or bath, rinse  Pour two ounces of Betasept (or Hibiclens) onto moist washcloth  Avoiding head, wash gently (does not foam)  Let sit on skin for three minutes  Rinse completely with water and towel dry    Repeat bath/shower for three consecutive days:  First bath... Two nights before the day of surgery.  Second bath... The night before surgery.  Third bath... The morning of surgery.  Do not apply lotions, deodorants or powder after the last bath.    DISCARD REMAINING PRODUCT AFTER LAST BATH!    DRUG FACTS    Active Ingredient: Chlorhexidine gluconae solution 4%        Warnings:  For external use only.  Do not use:  If you are allergic to chlorhexidine gluconate or any other ingredients listed on the label  As a pre-surgical cleanser of head or face    STOP USE and notify doctor if :  Irritation or allergic reaction occurs  If contact occurs in eyes, ears, mouth, rinse immediately with cold water.    Keep out of reach of children.  If swallowed get medica help or contact Poison Control Center.   Store between 60-80 degrees F.    Fabric Warning!  CHG WILL STAIN YOUR FABRICS!  Use with care around shower curtains, towels washcloths rugs and clothes.  Wipe surfaces immediately if accidentally splashed.      Laundering Instructions:  CHG skin cleanser will cause stains if used with chlorinated products.  Rinse immediately and completely and use only non-chlorine detergents.

## (undated) NOTE — LETTER
August 22, 2017    Patient: Ok Gonsalez   Date of Visit: 8/22/2017       To Whom It May Concern:    Ok Gonsalez was seen and treated in our emergency department on 8/22/2017. She may return to work 8/23/2017.     If you have any questions or concerns, ple

## (undated) NOTE — LETTER
Date & Time: 10/8/2019, 2:50 PM  Patient: Sam Clarity  Encounter Provider(s):    ANNIE Espitia       To Whom It May Concern:    Bertina Bumpers was seen and treated in our department on 10/8/2019. She can return to work 10-9-19.     If you have any ques

## (undated) NOTE — ED AVS SNAPSHOT
[facility] Emergency Department    [address]     [place] [address]    Phone:  [phone]    Fax:  [phone]           Susanskinny Aspen   MRN: [id number]    Department:  [facility] Emergency Department   Date of Visit:  2/16/2017 - Albuterol Sulfate  (90 Base) MCG/ACT Aers  - Oseltamivir Phosphate 75 MG Caps              Discharge Instructions       Return if you develop shortness of breath dizziness or pain. Tylenol every 4 hours for fever and body aches.   Use inhaler kirti discretion of that Physician.   If you need additional assistance selecting a physician, you may call the [facility] Physician Referral and Class Registration line at [phone] or find a doctor online by visiting [email or web address].    IF THE you to explore options for quitting.     - If you have concerns related to behavioral health issues or thoughts of harming yourself, contact [facility] at [phone].     - If you don’t have insurance, Marsha Cueva

## (undated) NOTE — ED AVS SNAPSHOT
Christina Berger   MRN: [id number]    Department:  [facility] Emergency Department   Date of Visit:  9/5/2018           Disclosure     Insurance plans vary and the physician(s) referred by the ER may not be covered by your plan.  Please contact your i CARE PHYSICIAN AT ONCE OR RETURN IMMEDIATELY TO THE EMERGENCY DEPARTMENT. If you have been prescribed any medication(s), please fill your prescription right away and begin taking the medication(s) as directed.   If you believe that any of the medications

## (undated) NOTE — ED AVS SNAPSHOT
Zan Blackmon   MRN: [id number]    Department:  [facility] Emergency Department   Date of Visit:  5/29/2019           Disclosure     Insurance plans vary and the physician(s) referred by the ER may not be covered by your plan.  Please contact you CARE PHYSICIAN AT ONCE OR RETURN IMMEDIATELY TO THE EMERGENCY DEPARTMENT. If you have been prescribed any medication(s), please fill your prescription right away and begin taking the medication(s) as directed.   If you believe that any of the medications

## (undated) NOTE — LETTER
Date & Time: 9/5/2018, 10:39 PM  Patient: Vida Nelson  Encounter Provider(s):    America Antoine MD       To Whom It May Concern:    Vida Nelson was seen and treated in our department on 9/5/2018. She may return to work.            If you have any questi

## (undated) NOTE — ED AVS SNAPSHOT
[facility] Emergency Department    [address] [place] [address]    Phone:  [phone]    Fax:  [phone]           Ike    MRN: [id number]    Department:  [facility] Emergency Department   Date of Visit:  1/10/2017 and Class Registration line at [phone] or find a doctor online by visiting [email or web address].    IF THERE IS ANY CHANGE OR WORSENING OF YOUR CONDITION, CALL YOUR PRIMARY CARE PHYSICIAN AT ONCE OR RETURN IMMEDIATELY TO [address] [place] [address].     If

## (undated) NOTE — ED AVS SNAPSHOT
[facility] Emergency Department    [address]     [place] [address]    Phone:  [phone]    Fax:  [phone]           Ayanna Stern   MRN: [id number]    Department:  [facility] Emergency Department   Date of Visit:  2/16/2017 If you have difficulty scheduling your follow-up appointment as directed, please call our  at [phone]. Si tiene problemas para programar cristhian christopher de seguimiento según lo indicado, llame al encargado de keaton al [phone].     It i continue to take your medications as instructed by your Primary Care doctor until you can check with your doctor. Please bring the medication list to your next doctor's appointment.     Any imaging studies and labs completed today can be reviewed in your [place] plans. To get signed up and covered, please call [phone] and ask to get set up for an insurance coverage that is in-network with Kehindemo[employer]yana Cueva. Maury     Sign up for [employer], your secure online medical record.   [employer] wi

## (undated) NOTE — ED AVS SNAPSHOT
[facility] Emergency Department    [address]     [place] [address]    Phone:  [phone]    Fax:  [phone]           Missy Mchugh   MRN: [id number]    Department:  [facility] Emergency Department   Date of Visit:  2/16/2017 and Class Registration line at [phone] or find a doctor online by visiting [email or web address].    IF THERE IS ANY CHANGE OR WORSENING OF YOUR CONDITION, CALL YOUR PRIMARY CARE PHYSICIAN AT ONCE OR RETURN IMMEDIATELY TO [address] [place] [address].     If

## (undated) NOTE — ED AVS SNAPSHOT
[facility] Emergency Department    [address]     [place] [address]    Phone:  [phone]    Fax:  [phone]           Rudy Rodriguez   MRN: [id number]    Department:  [facility] Emergency Department   Date of Visit:  4/21/2017 and Class Registration line at [phone] or find a doctor online by visiting [email or web address].    IF THERE IS ANY CHANGE OR WORSENING OF YOUR CONDITION, CALL YOUR PRIMARY CARE PHYSICIAN AT ONCE OR RETURN IMMEDIATELY TO [address] [place] [address].     If

## (undated) NOTE — ED AVS SNAPSHOT
[facility] Emergency Department    [address]     [place] [address]    Phone:  [phone]    Fax:  [phone]           Bernardo Cedeno   MRN: [id number]    Department:  [facility] Emergency Department   Date of Visit:  4/21/2017 our [address] at [phone]. Your Emergency Department team is here to serve you. You are our top priority. You were examined and treated today on an urgent basis only. This was not a substitute for ongoing medical care.  Often, one Emergency Dep pertaining to these instructions have been answered in a satisfactory manner. 24-Hour Pharmacies        Pharmacy Address Phone Number   [address] [facility] ([address]) [address] [facility] ([address] [place] [address] your Zip Code and Date of Birth to complete the sign-up process. If you do not sign up before the expiration date, you must request a new code.     Your unique Family Help & Wellness Access Code: [email or web address]  Expires: 6/20/2017  4:14 PM    If you have questions, you can c

## (undated) NOTE — LETTER
March 12, 2018    Patient: Ike Lopez   Date of Visit: 3/12/2018       To Whom It May Concern:    Ike Lopez was seen and treated in our emergency department on 3/12/2018. She should not return to work until 03/16/18.     If you have any questions or co

## (undated) NOTE — ED AVS SNAPSHOT
Bety Leal   MRN: [id number]    Department:  [facility] Emergency Department   Date of Visit:  10/8/2019           Disclosure     Insurance plans vary and the physician(s) referred by the ER may not be covered by your plan.  Please contact you CARE PHYSICIAN AT ONCE OR RETURN IMMEDIATELY TO THE EMERGENCY DEPARTMENT. If you have been prescribed any medication(s), please fill your prescription right away and begin taking the medication(s) as directed.   If you believe that any of the medications

## (undated) NOTE — IP AVS SNAPSHOT
[address]  [address] [phone]                Discharge Summary   2/3/2017    [facility]           Admission Information        Provider Department    2/3/2017 Valentin Scott MD [facility] 3e C-D up to 6 weeks following childbirth.   Contact your doctor or midwife immediately if you experience any of the following symptoms:  · Headache that does not go away  · Visual changes (seeing spots, blurred vision or partial vision loss)  · Feeling nauseated Handwashing & Infection Prevention  Active   Handwashing and Respiratory Hygiene Active               Additional Information       We are concerned for your overall well being:    - If you are a smoker or have smoked in the last 12 months, we encourage you